# Patient Record
Sex: FEMALE | Race: WHITE | Employment: UNEMPLOYED | URBAN - METROPOLITAN AREA
[De-identification: names, ages, dates, MRNs, and addresses within clinical notes are randomized per-mention and may not be internally consistent; named-entity substitution may affect disease eponyms.]

---

## 2017-09-11 ENCOUNTER — MEDICAL CORRESPONDENCE (OUTPATIENT)
Dept: HEALTH INFORMATION MANAGEMENT | Facility: CLINIC | Age: 42
End: 2017-09-11

## 2017-09-11 ENCOUNTER — TRANSFERRED RECORDS (OUTPATIENT)
Dept: HEALTH INFORMATION MANAGEMENT | Facility: CLINIC | Age: 42
End: 2017-09-11

## 2017-09-12 ENCOUNTER — MEDICAL CORRESPONDENCE (OUTPATIENT)
Dept: HEALTH INFORMATION MANAGEMENT | Facility: CLINIC | Age: 42
End: 2017-09-12

## 2017-09-27 ENCOUNTER — PRE VISIT (OUTPATIENT)
Dept: NEUROLOGY | Facility: CLINIC | Age: 42
End: 2017-09-27

## 2017-09-27 NOTE — TELEPHONE ENCOUNTER
1.  Date/reason for appt:  10/02/17    Myopathy    2.  Referring provider:  H[facility] Pace    3.  Call to patient (Yes / No - short description):  No referred    4.  Previous care at / records requested from:  [facility]

## 2017-09-29 NOTE — TELEPHONE ENCOUNTER
Records received from [facility].   Included  Office notes: 9/11/17    Missing/needed records: additional records? (fax came from referrals)

## 2017-10-02 ENCOUNTER — OFFICE VISIT (OUTPATIENT)
Dept: NEUROLOGY | Facility: CLINIC | Age: 42
End: 2017-10-02

## 2017-10-02 VITALS
HEIGHT: 68 IN | DIASTOLIC BLOOD PRESSURE: 79 MMHG | BODY MASS INDEX: 44.41 KG/M2 | HEART RATE: 94 BPM | OXYGEN SATURATION: 99 % | TEMPERATURE: 97.7 F | RESPIRATION RATE: 28 BRPM | SYSTOLIC BLOOD PRESSURE: 160 MMHG | WEIGHT: 293 LBS

## 2017-10-02 DIAGNOSIS — G72.9 MYOPATHY: Primary | ICD-10-CM

## 2017-10-02 DIAGNOSIS — G72.9 MYOPATHY: ICD-10-CM

## 2017-10-02 DIAGNOSIS — G71.3 MITOCHONDRIAL MYOPATHY: Primary | ICD-10-CM

## 2017-10-02 LAB
CK SERPL-CCNC: 38 U/L (ref 30–225)
LACTATE BLD-SCNC: 1.2 MMOL/L (ref 0.7–2)
TSH SERPL DL<=0.005 MIU/L-ACNC: 0.78 MU/L (ref 0.4–4)

## 2017-10-02 RX ORDER — SUMATRIPTAN 100 MG/1
100 TABLET, FILM COATED ORAL DAILY PRN
COMMUNITY
Start: 2017-08-08

## 2017-10-02 RX ORDER — ALBUTEROL SULFATE 90 UG/1
2 AEROSOL, METERED RESPIRATORY (INHALATION) DAILY PRN
COMMUNITY
Start: 2015-04-11

## 2017-10-02 RX ORDER — PROTRIPTYLINE HYDROCHLORIDE 10 MG/1
1 TABLET, FILM COATED ORAL 3 TIMES DAILY
Status: ON HOLD | COMMUNITY
Start: 2017-09-29 | End: 2019-10-25

## 2017-10-02 RX ORDER — POLYETHYLENE GLYCOL 3350 17 G/17G
17 POWDER, FOR SOLUTION ORAL DAILY PRN
COMMUNITY
Start: 2016-06-17

## 2017-10-02 RX ORDER — EPINEPHRINE 0.3 MG/.3ML
0.3 INJECTION SUBCUTANEOUS DAILY PRN
COMMUNITY
Start: 2016-09-13

## 2017-10-02 ASSESSMENT — PAIN SCALES - GENERAL: PAINLEVEL: WORST PAIN (10)

## 2017-10-02 NOTE — PATIENT INSTRUCTIONS
If you have questions or concerns following today's appointment, please contact   Mary Shultz at [phone].    For more urgent concerns, contact the neurology department triage line at [phone] option 3.    Labs and pulmonary function testing today.  Follow up with Dr. Otero in EMG and then again in 3 months.     We now have a  available to help patients with psychosocial needs, supportive counseling, advanced care planning, and insurance and/or disability questions. You can reach SAVANNA Hoang, LICSW at [phone].

## 2017-10-02 NOTE — LETTER
"10/2/2017       RE: Marianne Millan  [address] [place] [address]     Dear Colleague,    Thank you for referring your patient, Marianne Millan, to the [facility] NEUROLOGY at [facility]. Please see a copy of my visit note below.      DEPARTMENT OF NEUROLOGY  Referral for: \"mitochondrial myopathy\"  Patient Name:  Marianne Millan  MRN:  [id number]  :  1975  Date of Clinic Visit:  2017  Primary Care Provider:  Juan Mendoza  Provider Requesting Consult: Loree Trinidad    CHIEF COMPLAINT: \"heaviness, weakness, pain in my legs\"    HISTORY OF PRESENT ILLNESS:  Marianne Millan is a 42 year old female presenting presenting for evaluation of mitochondrial myopathy. She is referred from her dermatologist, Dr. Trinidad.   Marianne has a past medical history notable primarily for migraines and chronic back pain. She noted that starting approximately 2 months ago that she felt weak in both of her hip flexors. She noted difficulty getting in and out of her car, using the assistance of her arms to help bring her legs into the car. She denies weakness at the knee joint or at the ankles. She has been using a cane to walk for fear that she will trip and fall, she is also been utilizing support from her boyfriend and roommate. She doesn't have a cane available. She also notes that her hands intermittently have this heavy sensation as well, and she is dropping things that seem unrelated to their weight. She denies any coordination difficulties. Around the same time she notes intermittent patchy erythema or forearms associated with a heat sensation. There is no obvious trigger or temporal pattern to this and it lasts a few hours. She denies any cranial nerve muscle involvement particularly with the extraocular movements. She does note intermittent trouble swallowing, she gives a specific example of having trouble swallowing skittles, but no persistent difficulty swallowing " "liquids or solids. She has had weight gain over the past 2 months of an unknown amount which she attributes primarily to reduced activity. She occasionally feels short of breath even when minimally exerting herself. She denies any chest pains syncopal episodes, loss of consciousness, nausea, vomiting, numbness tingling, changes in hair or nails. She's never had any similar symptoms in the past. She notes that her childhood was unremarkable, she had no developmental abnormalities and was able to keep up with other kids in her class.   She has a family history of \"mitochondrial disease.\" Her older sister has 2 young boys who were having a lot of chest pain when they were young which prompted a cardiomyopathy workup the results of which are reported to me as \"mitochondrial myopathy. \"The patient's sister was subsequently tested with a muscle biopsy but no genetic testing, she is unsure what the specific results were but says that it was consistent with a \"mitochondrial myopathy. \"Her symptoms include respiratory weakness as well as generalized fatigue; she notes that other doctors have said she has approximately 50% of her expected muscle mass. The patient's mother has not been tested genetically. The patient has not been tested either with muscle biopsy or genetics. The patient has a twin sister who denies any similar symptoms to either the patient or the patient's older sister. The patient's mother is notable for having congestive heart failure of unknown etiology since the 30s. There is also a history of strokes in both her mom and her dad.    ASSESSMENT AND PLAN:  Marianne is a 42-year-old female presenting with bilateral hip flexor and some upper extremity weakness for the 2 months. Her chief complaint and clinical exam findings do not specifically point to mitochondrial myopathy; this presentation may be consistent with deconditioning due to obesity/lack of exercise, or any general myopathy. Certainly the family " history of cardiac disease, putative mitochondrial myopathy in her sister, and maternal lineage transmission, raise the bar of suspicion high. Given the patient's endorsement of dyspnea, there is some concern for respiratory involvement so we will obtain pulmonary function tests today for baseline. Given the family's history of cardiomyopathy, it is certainly reasonable to assess the patient's baseline heart function with a transthoracic echocardiogram. We would like to review these records prior to ordering further workup on the patient. If there is a known mitochondrial DNA mutation in the family, it would be prudent to test patient's leukocytes for that mutation alone. The cost of targeted mutation testing is low, and this approach would circumvent the need for more expensive and invasive procedures (such as sequencing of the entire mitochondrial genome, or muscle biopsy). If the information available does NOT include genetic testing, we may need to proceed forward with a muscle biopsy, possibly with genetic testing of the muscle sample. Until we get that information we will get basic myopathy labs and an EMG at the patient's earliest convenience.    Plan:  - serum labs: CK, Lactate, TSH, Aldolase, plasma GDF-15 (it is possible for this to be elevated in patients who are obese, so a positive result should be interpreted with caution)  - EMG w/ Dr. Otero  - PFTs today  - TTE at patient's convenience  - will obtain records from patient's older sisterKimberly  - will not perform muscle biopsy until we learn more about family history and results of previous tests    Patient was seen and discussed with Dr. Otero.    Chris Bundy MD  Neurology PGY3  [facility]  Pager: [phone]    ATTENDING ADDENDUM: Patient seen and examined with resident Dr Bundy. Agree with his assessment and recs as above. TT spent for patient care 45 minutes; more than half was counseling. I edited the assessment  "and plan to reflect my own impression and findings. Royal Otero MD      PHYSICAL EXAMINATION:  Vitals: /79  Pulse 94  Temp 97.7  F (36.5  C) (Oral)  Resp 28  Ht 1.727 m (5' 8\")  Wt (!) 146.7 kg (323 lb 8 oz)  SpO2 99%  Breastfeeding? No  BMI 49.19 kg/m2  General: appears stated age, no distress, present with boyfriend and room-mate  HEENT: normocephalic, moist membranes, no trauma  Cardiac: hypertensive  Pulmonary: non-labored on room air  Abdomen: obese  Extremities: warm to palpation  Skin: hyperpigmented dermatofibromas on both arms and legs; mild erythematous patch on L forearm ~4cm and not warm or painful to palpation  Psych: cooperative, appropriate  Neuro:   Mental status: alert and oriented to person, place, and time; language is fluent with intact comprehension and naming   Cranial nerves: PERRL, EOMI with intact smooth pursuit, full visual fields, fundi within normal limits, no facial asymmetry or ptosis, 5/5 bilateral pterygoid/masseter strength, facial sensation intact and symmetric, hearing intact to conversation, tongue and uvula are midline, 5/5 bilateral SCM/Trap strength, no hypophonia, no dysarthria   Motor: gross bulk is normal; no abnormal posture, tone, fasciculations, or movements/tremor   RIGHT LEFT    5 5   FDI 5 5   2nd/3rd digit flexion 5 5   2nd/3rd digit extension 5 5   4th/5th digit flexion 5 5   4th/5th digit extension 5 5   Biceps 5 5   Triceps 5 5   Deltoid 5 5   Hip flexion 5 5   Knee extension 5 5   Knee flexion 5 5   Ankle flexion 5 5   Ankle extension 5 5    Reflexes: absent Babinski. Did not test Cox.   RIGHT LEFT   Brachioradialis 2+ 2+   Biceps 2+ 2+   Triceps 2+ 2+   Patellar 1+ 1+   Achilles 2+ 2+    Sensory: Intact to light touch, pin prick, and proprioception in all extremities without extinction. Romberg exam within normal limits.   Coordination: Intact FNF and heel-shin. No dysmetria. No dysdiadochokinesia.   Gait: able to stand from sitting " position without assistance or pushing off chair but she appears quite stirff; normal station; gait is cautious with a cane but stride length is symmetric    INVESTIGATIONS: prior JANIS and HIV labs personally reviewed    REVIEW OF SYSTEMS: 12-point RoS negative except as per HPI.    Allergies   Allergen Reactions     Hydrochlorothiazide Shortness Of Breath     Lisinopril Swelling     Other reaction(s): Swelling  Facial Swelling     Meperidine Hives     Norco [Hydrocodone-Acetaminophen]      Other reaction(s): Other  Severe headache  headache     Amitriptyline      Other reaction(s): Other  RAPID HEART RATE  Heart palpitations     Benadryl Allergy Decongestant  [Allergy Decongestant] Hives     Ceftin [Cefuroxime]      Other reaction(s): Other  Dizziness, lightheaded  Dizziness, lightheaded     Latex      Other reaction(s): Swelling     Metoclopramide      Other reaction(s): Other  akathisia     Tramadol      Other reaction(s): Unknown Reaction  Pt stated being very agitated and last time in hospital needed two doses of ativan to calm her down.      Nsaids      Other reaction(s): Other  History of gastric bypass surgery     Current Outpatient Prescriptions   Medication Sig Dispense Refill     cyanocobalamin (RA VITAMIN B-12 TR) 1000 MCG TBCR Take 1,000 mcg by mouth daily       albuterol (PROAIR HFA/PROVENTIL HFA/VENTOLIN HFA) 108 (90 BASE) MCG/ACT Inhaler Inhale 2 puffs into the lungs daily as needed       polyethylene glycol (MIRALAX/GLYCOLAX) powder Take 17 g by mouth daily as needed       EPINEPHrine (EPIPEN/ADRENACLICK/OR ANY BX GENERIC EQUIV) 0.3 MG/0.3ML injection 2-pack Inject 0.3 mg into the muscle daily as needed       tiZANidine (ZANAFLEX) 4 MG tablet Take 1 tablet by mouth 3 times daily       protriptyline (VIVACTIL) 10 MG tablet Take 1 tablet by mouth 3 times daily       SUMAtriptan (IMITREX) 100 MG tablet Take 100 mg by mouth daily as needed       acetaminophen-codeine (TYLENOL #3) 300-30 MG per tablet  "Take 1-2 tablets by mouth every 4 hours as needed for moderate pain       Multiple Vitamins-Minerals (MULTIVITAMIN OR) Take 1 tablet by mouth daily       CALCIUM CARBONATE PO Take 1 tablet by mouth daily       acetaminophen (TYLENOL) 500 MG tablet Take 1-2 tablets by mouth every 6 hours as needed       gabapentin (NEURONTIN) 300 MG capsule Take one tablet at bedtime for one week, then may increase to one tablet twice daily for one week, then one tablet three times daily. 90 capsule 3     PMH: migraines    Past Surgical History:   Procedure Laterality Date     GASTRIC BYPASS  2011     Family History:  Patient's mom has a history of congestive heart failure since her 30s, also has strokes. The patient is an older sister who has respiratory weakness as well as generalized fatigue, muscle biopsy per patient's sister's report is that it is consistent with a \"mitochondrial myopathy.\" The patient's older sister also has 2 boys primarily with cardiomyopathy symptoms. The patient has a 20 sister who denies any symptoms and he was 3 children who are all healthy. Patient's father has a history of strokes, diabetes, myocardial infarction.    Social History     Social History     Marital status: Single     Spouse name: N/A     Number of children: N/A     Years of education: N/A     Occupational History     Not on file.     Social History Main Topics     Smoking status: Never Smoker     Smokeless tobacco: Never Used     Alcohol use 0.6 - 1.2 oz/week     1 - 2 Glasses of wine per week      Comment: YEARLY     Drug use: No     Sexual activity: Yes     Partners: Male     Other Topics Concern     Not on file     Social History Narrative       Again, thank you for allowing me to participate in the care of your patient.      Sincerely,    Royal Otero MD      "

## 2017-10-02 NOTE — PROGRESS NOTES
"  DEPARTMENT OF NEUROLOGY  Referral for: \"mitochondrial myopathy\"  Patient Name:  Marianne Millan  MRN:  [id number]  :  1975  Date of Clinic Visit:  2017  Primary Care Provider:  Juan Mendoza  Provider Requesting Consult: Loree Trinidad    CHIEF COMPLAINT: \"heaviness, weakness, pain in my legs\"    HISTORY OF PRESENT ILLNESS:  Marianne Millan is a 42 year old female presenting presenting for evaluation of mitochondrial myopathy. She is referred from her dermatologist, Dr. Trinidad.   Marianne has a past medical history notable primarily for migraines and chronic back pain. She noted that starting approximately 2 months ago that she felt weak in both of her hip flexors. She noted difficulty getting in and out of her car, using the assistance of her arms to help bring her legs into the car. She denies weakness at the knee joint or at the ankles. She has been using a cane to walk for fear that she will trip and fall, she is also been utilizing support from her boyfriend and roommate. She doesn't have a cane available. She also notes that her hands intermittently have this heavy sensation as well, and she is dropping things that seem unrelated to their weight. She denies any coordination difficulties. Around the same time she notes intermittent patchy erythema or forearms associated with a heat sensation. There is no obvious trigger or temporal pattern to this and it lasts a few hours. She denies any cranial nerve muscle involvement particularly with the extraocular movements. She does note intermittent trouble swallowing, she gives a specific example of having trouble swallowing skittles, but no persistent difficulty swallowing liquids or solids. She has had weight gain over the past 2 months of an unknown amount which she attributes primarily to reduced activity. She occasionally feels short of breath even when minimally exerting herself. She denies any chest pains syncopal episodes, loss of " "consciousness, nausea, vomiting, numbness tingling, changes in hair or nails. She's never had any similar symptoms in the past. She notes that her childhood was unremarkable, she had no developmental abnormalities and was able to keep up with other kids in her class.   She has a family history of \"mitochondrial disease.\" Her older sister has 2 young boys who were having a lot of chest pain when they were young which prompted a cardiomyopathy workup the results of which are reported to me as \"mitochondrial myopathy. \"The patient's sister was subsequently tested with a muscle biopsy but no genetic testing, she is unsure what the specific results were but says that it was consistent with a \"mitochondrial myopathy. \"Her symptoms include respiratory weakness as well as generalized fatigue; she notes that other doctors have said she has approximately 50% of her expected muscle mass. The patient's mother has not been tested genetically. The patient has not been tested either with muscle biopsy or genetics. The patient has a twin sister who denies any similar symptoms to either the patient or the patient's older sister. The patient's mother is notable for having congestive heart failure of unknown etiology since the 30s. There is also a history of strokes in both her mom and her dad.    ASSESSMENT AND PLAN:  Marianne is a 42-year-old female presenting with bilateral hip flexor and some upper extremity weakness for the 2 months. Her chief complaint and clinical exam findings do not specifically point to mitochondrial myopathy; this presentation may be consistent with deconditioning due to obesity/lack of exercise, or any general myopathy. Certainly the family history of cardiac disease, putative mitochondrial myopathy in her sister, and maternal lineage transmission, raise the bar of suspicion high. Given the patient's endorsement of dyspnea, there is some concern for respiratory involvement so we will obtain pulmonary " "function tests today for baseline. Given the family's history of cardiomyopathy, it is certainly reasonable to assess the patient's baseline heart function with a transthoracic echocardiogram. We would like to review these records prior to ordering further workup on the patient. If there is a known mitochondrial DNA mutation in the family, it would be prudent to test patient's leukocytes for that mutation alone. The cost of targeted mutation testing is low, and this approach would circumvent the need for more expensive and invasive procedures (such as sequencing of the entire mitochondrial genome, or muscle biopsy). If the information available does NOT include genetic testing, we may need to proceed forward with a muscle biopsy, possibly with genetic testing of the muscle sample. Until we get that information we will get basic myopathy labs and an EMG at the patient's earliest convenience.    Plan:  - serum labs: CK, Lactate, TSH, Aldolase, plasma GDF-15 (it is possible for this to be elevated in patients who are obese, so a positive result should be interpreted with caution)  - EMG w/ Dr. Otero  - PFTs today  - TTE at patient's convenience  - will obtain records from patient's older sisterKimberly  - will not perform muscle biopsy until we learn more about family history and results of previous tests    Patient was seen and discussed with Dr. Otero.    Chris Bundy MD  Neurology PGY3  [facility]  Pager: [phone]    ATTENDING ADDENDUM: Patient seen and examined with resident Dr Bundy. Agree with his assessment and recs as above. TT spent for patient care 45 minutes; more than half was counseling. I edited the assessment and plan to reflect my own impression and findings. Royal Otero MD      PHYSICAL EXAMINATION:  Vitals: /79  Pulse 94  Temp 97.7  F (36.5  C) (Oral)  Resp 28  Ht 1.727 m (5' 8\")  Wt (!) 146.7 kg (323 lb 8 oz)  SpO2 99%  Breastfeeding? No  BMI " 49.19 kg/m2  General: appears stated age, no distress, present with boyfriend and room-mate  HEENT: normocephalic, moist membranes, no trauma  Cardiac: hypertensive  Pulmonary: non-labored on room air  Abdomen: obese  Extremities: warm to palpation  Skin: hyperpigmented dermatofibromas on both arms and legs; mild erythematous patch on L forearm ~4cm and not warm or painful to palpation  Psych: cooperative, appropriate  Neuro:   Mental status: alert and oriented to person, place, and time; language is fluent with intact comprehension and naming   Cranial nerves: PERRL, EOMI with intact smooth pursuit, full visual fields, fundi within normal limits, no facial asymmetry or ptosis, 5/5 bilateral pterygoid/masseter strength, facial sensation intact and symmetric, hearing intact to conversation, tongue and uvula are midline, 5/5 bilateral SCM/Trap strength, no hypophonia, no dysarthria   Motor: gross bulk is normal; no abnormal posture, tone, fasciculations, or movements/tremor   RIGHT LEFT    5 5   FDI 5 5   2nd/3rd digit flexion 5 5   2nd/3rd digit extension 5 5   4th/5th digit flexion 5 5   4th/5th digit extension 5 5   Biceps 5 5   Triceps 5 5   Deltoid 5 5   Hip flexion 5 5   Knee extension 5 5   Knee flexion 5 5   Ankle flexion 5 5   Ankle extension 5 5    Reflexes: absent Babinski. Did not test Cox.   RIGHT LEFT   Brachioradialis 2+ 2+   Biceps 2+ 2+   Triceps 2+ 2+   Patellar 1+ 1+   Achilles 2+ 2+    Sensory: Intact to light touch, pin prick, and proprioception in all extremities without extinction. Romberg exam within normal limits.   Coordination: Intact FNF and heel-shin. No dysmetria. No dysdiadochokinesia.   Gait: able to stand from sitting position without assistance or pushing off chair but she appears quite stirff; normal station; gait is cautious with a cane but stride length is symmetric    INVESTIGATIONS: prior JANIS and HIV labs personally reviewed    REVIEW OF SYSTEMS: 12-point RoS negative  except as per HPI.    Allergies   Allergen Reactions     Hydrochlorothiazide Shortness Of Breath     Lisinopril Swelling     Other reaction(s): Swelling  Facial Swelling     Meperidine Hives     Norco [Hydrocodone-Acetaminophen]      Other reaction(s): Other  Severe headache  headache     Amitriptyline      Other reaction(s): Other  RAPID HEART RATE  Heart palpitations     Benadryl Allergy Decongestant  [Allergy Decongestant] Hives     Ceftin [Cefuroxime]      Other reaction(s): Other  Dizziness, lightheaded  Dizziness, lightheaded     Latex      Other reaction(s): Swelling     Metoclopramide      Other reaction(s): Other  akathisia     Tramadol      Other reaction(s): Unknown Reaction  Pt stated being very agitated and last time in hospital needed two doses of ativan to calm her down.      Nsaids      Other reaction(s): Other  History of gastric bypass surgery     Current Outpatient Prescriptions   Medication Sig Dispense Refill     cyanocobalamin (RA VITAMIN B-12 TR) 1000 MCG TBCR Take 1,000 mcg by mouth daily       albuterol (PROAIR HFA/PROVENTIL HFA/VENTOLIN HFA) 108 (90 BASE) MCG/ACT Inhaler Inhale 2 puffs into the lungs daily as needed       polyethylene glycol (MIRALAX/GLYCOLAX) powder Take 17 g by mouth daily as needed       EPINEPHrine (EPIPEN/ADRENACLICK/OR ANY BX GENERIC EQUIV) 0.3 MG/0.3ML injection 2-pack Inject 0.3 mg into the muscle daily as needed       tiZANidine (ZANAFLEX) 4 MG tablet Take 1 tablet by mouth 3 times daily       protriptyline (VIVACTIL) 10 MG tablet Take 1 tablet by mouth 3 times daily       SUMAtriptan (IMITREX) 100 MG tablet Take 100 mg by mouth daily as needed       acetaminophen-codeine (TYLENOL #3) 300-30 MG per tablet Take 1-2 tablets by mouth every 4 hours as needed for moderate pain       Multiple Vitamins-Minerals (MULTIVITAMIN OR) Take 1 tablet by mouth daily       CALCIUM CARBONATE PO Take 1 tablet by mouth daily       acetaminophen (TYLENOL) 500 MG tablet Take 1-2 tablets  "by mouth every 6 hours as needed       gabapentin (NEURONTIN) 300 MG capsule Take one tablet at bedtime for one week, then may increase to one tablet twice daily for one week, then one tablet three times daily. 90 capsule 3     PMH: migraines    Past Surgical History:   Procedure Laterality Date     GASTRIC BYPASS  2011     Family History:  Patient's mom has a history of congestive heart failure since her 30s, also has strokes. The patient is an older sister who has respiratory weakness as well as generalized fatigue, muscle biopsy per patient's sister's report is that it is consistent with a \"mitochondrial myopathy.\" The patient's older sister also has 2 boys primarily with cardiomyopathy symptoms. The patient has a 20 sister who denies any symptoms and he was 3 children who are all healthy. Patient's father has a history of strokes, diabetes, myocardial infarction.    Social History     Social History     Marital status: Single     Spouse name: N/A     Number of children: N/A     Years of education: N/A     Occupational History     Not on file.     Social History Main Topics     Smoking status: Never Smoker     Smokeless tobacco: Never Used     Alcohol use 0.6 - 1.2 oz/week     1 - 2 Glasses of wine per week      Comment: YEARLY     Drug use: No     Sexual activity: Yes     Partners: Male     Other Topics Concern     Not on file     Social History Narrative       "

## 2017-10-02 NOTE — MR AVS SNAPSHOT
After Visit Summary   10/2/2017    Marianne Millan    MRN: [id number]           Patient Information     Date Of Birth          1975        Visit Information        Provider Department      10/2/2017 11:50 AM Royal Otero MD [facility] Neurology        Today's Diagnoses     Myopathy    -  1      Care Instructions    If you have questions or concerns following today's appointment, please contact   Mary Shultz at [phone].    For more urgent concerns, contact the neurology department triage line at [phone] option 3.    Labs and pulmonary function testing today.  Follow up with Dr. Otero in EMG and then again in 3 months.     We now have a  available to help patients with psychosocial needs, supportive counseling, advanced care planning, and insurance and/or disability questions. You can reach SAVANNA Hoang, [facility] at [phone].              Follow-ups after your visit        Your next 10 appointments already scheduled     Oct 02, 2017  1:30 PM CDT   PFT VISIT with  PFL B   [facility] Pulmonary Function Testing ([facility])    [address] [place] [address] [phone]            Oct 02, 2017  2:00 PM CDT   LAB with  LAB   [facility] Lab ([facility])    [address] [place] [address] [phone]           Patient must bring picture ID. Patient should be prepared to give a urine specimen  Please do not eat 10-12 hours before your appointment if you are coming in fasting for labs on lipids, cholesterol, or glucose (sugar). Pregnant women should follow their Care Team instructions. Water with medications is okay. Do not drink coffee or other fluids. If you have concerns about taking  your medications, please ask at office or if scheduling via Tarisa, send a message by clicking on Secure Messaging, Message Your Care Team.            Nov 07,  2017 10:15 AM CST   (Arrive by 10:00 AM)   EMG with Royal Otero MD   [facility] EMG ([facility])    [address] [address] [phone]           Do not use lotions or creams on the area to be tested. If you are on blood thinners (Warfarin, Coumadin, Lovenox, etc), please contact your primary care physician to check if it is safe to stop them 3 days prior to testing. If you have anxiety, please check with your referring physician to obtain anti-anxiety medication for the procedure.              Future tests that were ordered for you today     Open Future Orders        Priority Expected Expires Ordered    PFT General Lab Testing Routine 10/2/2017 10/2/2018 10/2/2017    PFT General Lab Testing Routine  10/2/2018 10/2/2017    EMG Routine  10/2/2018 10/2/2017    TSH with free T4 reflex Routine  10/2/2018 10/2/2017    Lactic acid whole blood Routine  10/2/2018 10/2/2017    Pyruvic acid Routine  10/2/2018 10/2/2017    plasma GDF15 level to [facility] [employer]: Laboratory Miscellaneous Order Routine  10/2/2018 10/2/2017    CK total Routine  10/2/2018 10/2/2017    Aldolase Routine  10/3/2018 10/2/2017            Who to contact     Please call your clinic at [phone] to:    Ask questions about your health    Make or cancel appointments    Discuss your medicines    Learn about your test results    Speak to your doctor   If you have compliments or concerns about an experience at your clinic, or if you wish to file a complaint, please contact [facility] Patient Relations at [phone] or email us at [email or web address]         Additional Information About Your Visit        zLensehart Information     Jambo is an electronic gateway that provides easy, online access to your medical records. With Jambo, you can request a clinic appointment, read your test results, renew a prescription or communicate  "with your care team.     To sign up for [email or web address] visit the website at [email or web address]   You will be asked to enter the access code listed below, as well as some personal information. Please follow the directions to create your username and password.     Your access code is: [email or web address]  Expires: 2017  6:30 AM     Your access code will  in 90 days. If you need help or a new code, please contact your [facility] or call [phone] for assistance.        Care EveryWhere ID     This is your Care EveryWhere ID. This could be used by other organizations to access your [place] medical records  [id number]        Your Vitals Were     Pulse Temperature Respirations Height Pulse Oximetry Breastfeeding?    94 97.7  F (36.5  C) (Oral) 28 1.727 m (5' 8\") 99% No    BMI (Body Mass Index)                   49.19 kg/m2            Blood Pressure from Last 3 Encounters:   10/02/17 160/79   14 113/71    Weight from Last 3 Encounters:   10/02/17 (!) 146.7 kg (323 lb 8 oz)   14 122.5 kg (270 lb)               Primary Care Provider Office Phone # Fax #    Juan Mendoza [phone]       [address] [address]        Equal Access to Services     ROSARIO VALIENTE AH: Hadii aisha ku hadasho Sorichard, waaxda luqadaha, qaybta kaalmada chapoyada, craig aragon. So [facility] [phone].    ATENCIÓN: Si habla español, tiene a wright disposición servicios gratuitos de asistencia lingüística. Meghana al [phone].    We comply with applicable federal civil rights laws and [place] laws. We do not discriminate on the basis of race, color, national origin, age, disability, sex, sexual orientation, or gender identity.            Thank you!     Thank you for choosing [facility] NEUROLOGY  for your care. Our goal is always to provide you with excellent care. Hearing back from our patients is one way we can continue to " improve our services. Please take a few minutes to complete the written survey that you may receive in the mail after your visit with us. Thank you!             Your Updated Medication List - Protect others around you: Learn how to safely use, store and throw away your medicines at www.disposemymeds.org.          This list is accurate as of: 10/2/17  1:28 PM.  Always use your most recent med list.                   Brand Name Dispense Instructions for use Diagnosis    acetaminophen-codeine 300-30 MG per tablet    TYLENOL #3     Take 1-2 tablets by mouth every 4 hours as needed for moderate pain        albuterol 108 (90 BASE) MCG/ACT Inhaler    PROAIR HFA/PROVENTIL HFA/VENTOLIN HFA     Inhale 2 puffs into the lungs daily as needed        CALCIUM CARBONATE PO      Take 1 tablet by mouth daily        EPINEPHrine 0.3 MG/0.3ML injection 2-pack    EPIPEN/ADRENACLICK/or ANY BX GENERIC EQUIV     Inject 0.3 mg into the muscle daily as needed        gabapentin 300 MG capsule    NEURONTIN    90 capsule    Take one tablet at bedtime for one week, then may increase to one tablet twice daily for one week, then one tablet three times daily.    Headache(784.0), Cervicalgia       MULTIVITAMIN PO      Take 1 tablet by mouth daily        polyethylene glycol powder    MIRALAX/GLYCOLAX     Take 17 g by mouth daily as needed        protriptyline 10 MG tablet    VIVACTIL     Take 1 tablet by mouth 3 times daily        RA VITAMIN B-12 TR 1000 MCG Tbcr   Generic drug:  cyanocobalamin      Take 1,000 mcg by mouth daily        SUMAtriptan 100 MG tablet    IMITREX     Take 100 mg by mouth daily as needed        tiZANidine 4 MG tablet    ZANAFLEX     Take 1 tablet by mouth 3 times daily        TYLENOL 500 MG tablet   Generic drug:  acetaminophen      Take 1-2 tablets by mouth every 6 hours as needed

## 2017-10-03 LAB
ALDOLASE SERPL-CCNC: 2.5 U/L (ref 1.5–8.1)
MISCELLANEOUS TEST: NORMAL

## 2017-10-04 LAB — PYRUVATE CSF-SCNC: 0.09 MMOL/L (ref 0.03–0.11)

## 2017-10-06 LAB
RESULT: NORMAL
SEND OUTS MISC TEST CODE: NORMAL
SEND OUTS MISC TEST SPECIMEN: NORMAL
TEST NAME: NORMAL

## 2017-10-09 LAB
EXPTIME-PRE: 7.83 SEC
FEF2575-%PRED-PRE: 89 %
FEF2575-PRE: 2.81 L/SEC
FEF2575-PRED: 3.15 L/SEC
FEFMAX-%PRED-PRE: 102 %
FEFMAX-PRE: 7.65 L/SEC
FEFMAX-PRED: 7.49 L/SEC
FEV1-%PRED-PRE: 102 %
FEV1-PRE: 3.17 L
FEV1FEV6-PRE: 79 %
FEV1FEV6-PRED: 83 %
FEV1FVC-PRE: 79 %
FEV1FVC-PRED: 82 %
FIFMAX-PRE: 5.97 L/SEC
FVC-%PRED-PRE: 106 %
FVC-PRE: 4.01 L
FVC-PRED: 3.77 L
MEP-PRE: 85 CMH2O
MIP-PRE: -70 CMH2O

## 2017-11-07 ENCOUNTER — OFFICE VISIT (OUTPATIENT)
Dept: NEUROLOGY | Facility: CLINIC | Age: 42
End: 2017-11-07

## 2017-11-07 DIAGNOSIS — M62.81 PROXIMAL MUSCLE WEAKNESS: Primary | ICD-10-CM

## 2017-11-07 DIAGNOSIS — G71.3 MITOCHONDRIAL MYOPATHY: ICD-10-CM

## 2017-11-07 NOTE — MR AVS SNAPSHOT
After Visit Summary   2017    Marianne Millan    MRN: [id number]           Patient Information     Date Of Birth          1975        Visit Information        Provider Department      2017 10:15 AM Royal Otero MD  Health EMG        Today's Diagnoses     Proximal muscle weakness    -  1    Mitochondrial myopathy           Follow-ups after your visit        Future tests that were ordered for you today     Open Future Orders        Priority Expected Expires Ordered    Needle EMG Each Extremity w/Paraspinal Area Limited (78545) Routine  2018            Who to contact     Please call your clinic at [phone] to:    Ask questions about your health    Make or cancel appointments    Discuss your medicines    Learn about your test results    Speak to your doctor   If you have compliments or concerns about an experience at your clinic, or if you wish to file a complaint, please contact [facility] Patient Relations at [phone] or email us at [email or web address]         Additional Information About Your Visit        MyChart Information     [employer] is an electronic gateway that provides easy, online access to your medical records. With [employer], you can request a clinic appointment, read your test results, renew a prescription or communicate with your care team.     To sign up for [employer] visit the website at [email or web address]   You will be asked to enter the access code listed below, as well as some personal information. Please follow the directions to create your username and password.     Your access code is: [email or web address]  Expires: 2017  5:30 AM     Your access code will  in 90 days. If you need help or a new code, please contact your [facility] or call [phone] for assistance.        Care EveryWhere ID     This is your Care EveryWhere ID. This could be used by  other organizations to access your [place] medical records  [id number]         Blood Pressure from Last 3 Encounters:   10/02/17 160/79   04/09/14 113/71    Weight from Last 3 Encounters:   10/02/17 (!) 146.7 kg (323 lb 8 oz)   04/09/14 122.5 kg (270 lb)              We Performed the Following     EMG     HC NCS MOTOR W OR W/O F-WAVE, 9 OR 10     Needle EMG Non-Extremity Muscles w/Nerve Conduction (37504)        Primary Care Provider Office Phone # Fax #    Juan Mendoza [phone]       [facility] [address] [facility] [address]        Equal Access to Services     ROSARIO VALIENTE : Hadii aisha jimenez Sorichard, waaxda luqadaha, qaybta kaalmada ademanuel, craig aragon. So [facility] [phone].    ATENCIÓN: Si habla español, tiene a wright disposición servicios gratuitos de asistencia lingüística. Llame al [phone].    We comply with applicable federal civil rights laws and [place] laws. We do not discriminate on the basis of race, color, national origin, age, disability, sex, sexual orientation, or gender identity.            Thank you!     Thank you for choosing [facility]  for your care. Our goal is always to provide you with excellent care. Hearing back from our patients is one way we can continue to improve our services. Please take a few minutes to complete the written survey that you may receive in the mail after your visit with us. Thank you!             Your Updated Medication List - Protect others around you: Learn how to safely use, store and throw away your medicines at www.disposemymeds.org.          This list is accurate as of: 11/7/17 11:15 AM.  Always use your most recent med list.                   Brand Name Dispense Instructions for use Diagnosis    acetaminophen-codeine 300-30 MG per tablet    TYLENOL #3     Take 1-2 tablets by mouth every 4 hours as needed for moderate pain        albuterol 108 (90 BASE) MCG/ACT Inhaler    PROAIR  HFA/PROVENTIL HFA/VENTOLIN HFA     Inhale 2 puffs into the lungs daily as needed        CALCIUM CARBONATE PO      Take 1 tablet by mouth daily        EPINEPHrine 0.3 MG/0.3ML injection 2-pack    EPIPEN/ADRENACLICK/or ANY BX GENERIC EQUIV     Inject 0.3 mg into the muscle daily as needed        gabapentin 300 MG capsule    NEURONTIN    90 capsule    Take one tablet at bedtime for one week, then may increase to one tablet twice daily for one week, then one tablet three times daily.    Headache(784.0), Cervicalgia       MULTIVITAMIN PO      Take 1 tablet by mouth daily        polyethylene glycol powder    MIRALAX/GLYCOLAX     Take 17 g by mouth daily as needed        protriptyline 10 MG tablet    VIVACTIL     Take 1 tablet by mouth 3 times daily        RA VITAMIN B-12 TR 1000 MCG Tbcr   Generic drug:  cyanocobalamin      Take 1,000 mcg by mouth daily        SUMAtriptan 100 MG tablet    IMITREX     Take 100 mg by mouth daily as needed        tiZANidine 4 MG tablet    ZANAFLEX     Take 1 tablet by mouth 3 times daily        TYLENOL 500 MG tablet   Generic drug:  acetaminophen      Take 1-2 tablets by mouth every 6 hours as needed

## 2017-11-07 NOTE — LETTER
11/7/2017     RE: Marianne Millan  [address] [place] [address]     Dear Colleague,    Thank you for referring your patient, Marianne Millan, to the [facility] EMG at [facility]. Please see a copy of my visit note below.        [facility]  Electrodiagnostic Laboratory    Nerve Conduction & EMG Report          Patient: Marianne Millan YOB: 1975  Patient ID: [id number] Age: 42 Years 5 Months  Gender: Female      History & Examination:    42 year old obese woman with weakness of hip flexion over the past 3 months and a family history of mitochondrial myopathy. CK, aldolase, blood lactic, pyruvic and GDF15 levels were all normal. Query myopathy.    Techniques: Motor and sensory conduction studies were done with surface recording electrodes. Temperature was monitored and recorded throughout the study. Upper extremities were maintained at a temperature of 32 degrees Centigrade or higher.  Lower extremities were maintained at a temperature of 31degrees Centigrade or higher. EMG was done with a concentric needle electrode.     Results:    Right median, ulnar, radial, and superficial peroneal antidromic sensory NCSs were normal. Right sural antidromic sensory NCS showed attenuated SNAP amplitude and normal conduction velocity, but size of the calf was such that supramaximal stimulation of the sural nerve was difficult to accomplish. Right median, ulnar, and tibial motor NCSs were normal. Right deep peroneal motor NCS showed normal distal latency and conduction velocities, and mildly attenuated CMAP amplitudes, although again, supramaximal stimulation of the nerve at the ankle may have been difficult due to size. Right tibial and ulnar F-waves were normal. EMG of right deltoid and vastus lateralis showed possibly early recruitment of either small, short duration, polyphasic (deltoid) or simply polyphasic, but normal amplitude (vastus lateralis) MUPs,  without abnormal spontaneous activity. EMG of right triceps, FDI, mid thoracic paraspinals, gluteus medius, tibialis anterior, and medial gastrocnemius, was normal.     Interpretation:    Possible, very mild, non-irritable myopathy. The needle EMG findings are truly borderline and therefore clinical correlation is required. Please remember that EMG in mitochondrial myopathies is not uncommonly normal or minimally abnormal.   The mildly attenuated right sural sensory and right peroneal motor amplitudes may be due to technical reasons (large lower leg size and inability to completely depolarize the nerves with surface stimulation). Again, clinical correlation for signs/symptoms of polyneuropathy would be useful.     EMG Physician:    Royal Otero MD         Sensory NCS      Nerve / Sites Rec. Site Onset Peak NP Amp Ref. PP Amp Dist Zoltan Ref. Temp     ms ms  V  V  V cm m/s m/s  C   R MEDIAN - Dig II Anti      Wrist Dig II 2.24 3.07 46.5 10.0 79.2 14 62.5 48.0 29.3   R ULNAR - Dig V Anti      Wrist Dig V 2.19 2.97 44.9 8.0 80.5 12.5 57.1 48.0 29.2   R RADIAL - Snuff      Forearm Snuff 1.82 2.40 45.0 15.0 67.2 10.5 57.6 48.0 29.7   R SURAL - Lat Mall      Calf Ankle 2.97 3.91 4.3 8.0 1.2 14 47.2 38.0 32.6      Calf Ankle 3.07 3.91 4.5 8.0 0.14 14 45.6  32.7      Calf Ankle 3.07 3.80 2.1 8.0 1.3 14 45.6  33.5   R SUP PERONEAL      Lat Leg Man 2.71 3.28 7.1  8.0 12.5 46.2 38.0 33       Motor NCS      Nerve / Sites Rec. Site Lat Ref. Amp Ref. Rel Amp Dist Zoltan Ref. Dur. Area Temp.     ms ms mV mV % cm m/s m/s ms %  C   R MEDIAN - APB      Wrist APB 3.85 4.40 7.4 5.0 100 8   5.31 100 30.1      Elbow APB 7.71  7.1  95.5 22 57.1 48.0 6.30 96.8 30.1   R ULNAR - ADM      Wrist ADM 3.23 3.50 10.0 5.0 100 8   6.30 100 30.3      B.Elbow ADM 6.82  9.2  91.9 21 58.4 48.0 6.30 93.9 30.1      A.Elbow ADM 8.39  8.1  81.6 8 51.2 48.0 6.41 79.1 29.8   R DEEP PERONEAL - EDB      Ankle EDB 3.39 6.00 2.2 2.5 100 8   4.22 100 32.1       FibHead EDB 12.29  1.7  77.5 37 41.5 38.0 4.74 83.1 32.4      Pop Fos EDB 13.75  1.7  76.6 8 54.9 38.0 4.79 82.5 32.5   R TIBIAL - AH      Ankle AH 5.47 6.00 6.7 4.0 100 8   4.95 100 32.7      Pop Fos AH 15.16  3.8  56.5 42 43.4 38.0 6.41 64.8 32.9       F  Wave      Nerve Min F Lat Max F Lat Mean FLat Temp.    ms ms ms  C   R TIBIAL 52.19 60.94 54.30 32.9   R ULNAR 28.23 29.48 28.61 29.4       EMG Summary Table     Spontaneous MUAP Recruitment    IA Fib Fasc H.F. Amp Dur. PPP Pattern   R. TIB ANTERIOR Normal None None None N N None Normal   R. GASTROCN (MED) Normal None None None N N None Normal   R. VAST LATERALIS Normal None None None N N 1+ early   R. FIRST D INTEROSS Normal None None None N N None Normal   R. TRICEPS Normal None None None N N None Normal   R. DELTOID Normal None None None 1- 1- 1+ early   R. GLUTEUS MED Normal None None None N N None Normal   R. THOR PSP (M) Normal None None None N N None Normal                                  Discussed EMG findings and results of recent lab studies with Marianne. So far I have no strong evidence to point to mitochondrial myopathy. I told her that in instances with borderline/equivocally abnormal EMG, minimal muscle weakness on exam, normal CK, lactic acid and GDF-15, muscle biopsy is very low yield and most likely will be a waste of patient's money and effort. The likelihood of MM is very low. I told Marianne that I would contact her sister (she has already given us permission to reach her) and inquire whether her family has had genetic testing for MM and what the testing showed. If there is a definitely pathogenic mutation found in leukocyte DNA I would test Marianne for the same mutation, and stop there. If however genetic confirmation has not been obtained, unfortunately we may have to go ahead with a muscle biopsy to confirm or refute MM- even the newest blood tests including GDF15 are definitely not 100% sensitive for this diagnosis, and EMGs are not  uncommonly normal in MM. Marianne has a good understanding of above. Will follow with her after we discuss the situation with her sister. GM    Again, thank you for allowing me to participate in the care of your patient.      Sincerely,    Royal Otero MD

## 2017-11-07 NOTE — PROGRESS NOTES
Discussed EMG findings and results of recent lab studies with Marianne. So far I have no strong evidence to point to mitochondrial myopathy. I told her that in instances with borderline/equivocally abnormal EMG, minimal muscle weakness on exam, normal CK, lactic acid and GDF-15, muscle biopsy is very low yield and most likely will be a waste of patient's money and effort. The likelihood of MM is very low. I told Marianne that I would contact her sister (she has already given us permission to reach her) and inquire whether her family has had genetic testing for MM and what the testing showed. If there is a definitely pathogenic mutation found in leukocyte DNA I would test Marianne for the same mutation, and stop there. If however genetic confirmation has not been obtained, unfortunately we may have to go ahead with a muscle biopsy to confirm or refute MM- even the newest blood tests including GDF15 are definitely not 100% sensitive for this diagnosis, and EMGs are not uncommonly normal in MM. Marianne has a good understanding of above. Will follow with her after we discuss the situation with her sister.     Addendum: Sister name is Kimberly, phone # is [phone].

## 2017-11-07 NOTE — PROGRESS NOTES
[facility]  Electrodiagnostic Laboratory    Nerve Conduction & EMG Report          Patient: Marianne Millan YOB: 1975  Patient ID: [id number] Age: 42 Years 5 Months  Gender: Female      History & Examination:    42 year old obese woman with weakness of hip flexion over the past 3 months and a family history of mitochondrial myopathy. CK, aldolase, blood lactic, pyruvic and GDF15 levels were all normal. Query myopathy.    Techniques: Motor and sensory conduction studies were done with surface recording electrodes. Temperature was monitored and recorded throughout the study. Upper extremities were maintained at a temperature of 32 degrees Centigrade or higher.  Lower extremities were maintained at a temperature of 31degrees Centigrade or higher. EMG was done with a concentric needle electrode.     Results:    Right median, ulnar, radial, and superficial peroneal antidromic sensory NCSs were normal. Right sural antidromic sensory NCS showed attenuated SNAP amplitude and normal conduction velocity, but size of the calf was such that supramaximal stimulation of the sural nerve was difficult to accomplish. Right median, ulnar, and tibial motor NCSs were normal. Right deep peroneal motor NCS showed normal distal latency and conduction velocities, and mildly attenuated CMAP amplitudes, although again, supramaximal stimulation of the nerve at the ankle may have been difficult due to size. Right tibial and ulnar F-waves were normal. EMG of right deltoid and vastus lateralis showed possibly early recruitment of either small, short duration, polyphasic (deltoid) or simply polyphasic, but normal amplitude (vastus lateralis) MUPs, without abnormal spontaneous activity. EMG of right triceps, FDI, mid thoracic paraspinals, gluteus medius, tibialis anterior, and medial gastrocnemius, was normal.     Interpretation:    Possible, very mild, non-irritable myopathy. The needle EMG findings are truly  borderline and therefore clinical correlation is required. Please remember that EMG in mitochondrial myopathies is not uncommonly normal or minimally abnormal.   The mildly attenuated right sural sensory and right peroneal motor amplitudes may be due to technical reasons (large lower leg size and inability to completely depolarize the nerves with surface stimulation). Again, clinical correlation for signs/symptoms of polyneuropathy would be useful.     EMG Physician:    Royal Otero MD         Sensory NCS      Nerve / Sites Rec. Site Onset Peak NP Amp Ref. PP Amp Dist Zoltan Ref. Temp     ms ms  V  V  V cm m/s m/s  C   R MEDIAN - Dig II Anti      Wrist Dig II 2.24 3.07 46.5 10.0 79.2 14 62.5 48.0 29.3   R ULNAR - Dig V Anti      Wrist Dig V 2.19 2.97 44.9 8.0 80.5 12.5 57.1 48.0 29.2   R RADIAL - Snuff      Forearm Snuff 1.82 2.40 45.0 15.0 67.2 10.5 57.6 48.0 29.7   R SURAL - Lat Mall      Calf Ankle 2.97 3.91 4.3 8.0 1.2 14 47.2 38.0 32.6      Calf Ankle 3.07 3.91 4.5 8.0 0.14 14 45.6  32.7      Calf Ankle 3.07 3.80 2.1 8.0 1.3 14 45.6  33.5   R SUP PERONEAL      Lat Leg Man 2.71 3.28 7.1  8.0 12.5 46.2 38.0 33       Motor NCS      Nerve / Sites Rec. Site Lat Ref. Amp Ref. Rel Amp Dist Zoltan Ref. Dur. Area Temp.     ms ms mV mV % cm m/s m/s ms %  C   R MEDIAN - APB      Wrist APB 3.85 4.40 7.4 5.0 100 8   5.31 100 30.1      Elbow APB 7.71  7.1  95.5 22 57.1 48.0 6.30 96.8 30.1   R ULNAR - ADM      Wrist ADM 3.23 3.50 10.0 5.0 100 8   6.30 100 30.3      B.Elbow ADM 6.82  9.2  91.9 21 58.4 48.0 6.30 93.9 30.1      A.Elbow ADM 8.39  8.1  81.6 8 51.2 48.0 6.41 79.1 29.8   R DEEP PERONEAL - EDB      Ankle EDB 3.39 6.00 2.2 2.5 100 8   4.22 100 32.1      FibHead EDB 12.29  1.7  77.5 37 41.5 38.0 4.74 83.1 32.4      Pop Fos EDB 13.75  1.7  76.6 8 54.9 38.0 4.79 82.5 32.5   R TIBIAL - AH      Ankle AH 5.47 6.00 6.7 4.0 100 8   4.95 100 32.7      Pop Fos AH 15.16  3.8  56.5 42 43.4 38.0 6.41 64.8 32.9       F  Wave       Nerve Min F Lat Max F Lat Mean FLat Temp.    ms ms ms  C   R TIBIAL 52.19 60.94 54.30 32.9   R ULNAR 28.23 29.48 28.61 29.4       EMG Summary Table     Spontaneous MUAP Recruitment    IA Fib Fasc H.F. Amp Dur. PPP Pattern   R. TIB ANTERIOR Normal None None None N N None Normal   R. GASTROCN (MED) Normal None None None N N None Normal   R. VAST LATERALIS Normal None None None N N 1+ early   R. FIRST D INTEROSS Normal None None None N N None Normal   R. TRICEPS Normal None None None N N None Normal   R. DELTOID Normal None None None 1- 1- 1+ early   R. GLUTEUS MED Normal None None None N N None Normal   R. THOR PSP (M) Normal None None None N N None Normal

## 2017-11-13 ENCOUNTER — TELEPHONE (OUTPATIENT)
Dept: NEUROLOGY | Facility: CLINIC | Age: 42
End: 2017-11-13

## 2017-11-27 ENCOUNTER — TELEPHONE (OUTPATIENT)
Dept: NEUROLOGY | Facility: CLINIC | Age: 42
End: 2017-11-27

## 2017-11-27 NOTE — TELEPHONE ENCOUNTER
Called patient to report that we have been unable to contact her sister Kimberly re: genetic testing and health history in helping us develop a plan of care for Marianne. Dr. Otero does not want to delay treatment further and would like to proceed with a muscle biopsy. Patient is agreeable to this.     Regina Payton, RN Care Coordinator

## 2017-11-27 NOTE — TELEPHONE ENCOUNTER
Spoke with patient's sister. She has agreed to sign an [place] for us to access her medical records. I will mail this to her and look for it back in the next 1-2 weeks.     Regina Payton RN Care Coordinator

## 2017-12-07 NOTE — TELEPHONE ENCOUNTER
Still have not received [place] back from sister, Kimberly. Called Marianne and let her know this and that if she talk to her sister, she should ask her about this. I will continue to monitor the mail for this document.     Regina Payton RN Care Coordinator

## 2019-01-14 ENCOUNTER — TELEPHONE (OUTPATIENT)
Dept: RHEUMATOLOGY | Facility: CLINIC | Age: 44
End: 2019-01-14

## 2019-01-14 NOTE — TELEPHONE ENCOUNTER
Informed referring provider that we do not see fibromyalgia, chronic pain in our clinic as we do not have the capacity to see all patients that are referred to us.   Nancy Santos CMA  1/14/2019 3:14 PM

## 2019-10-22 ENCOUNTER — TRANSFERRED RECORDS (OUTPATIENT)
Dept: HEALTH INFORMATION MANAGEMENT | Facility: CLINIC | Age: 44
End: 2019-10-22

## 2019-10-24 RX ORDER — FLUOXETINE 40 MG/1
40 CAPSULE ORAL DAILY
COMMUNITY

## 2019-10-24 RX ORDER — NABUMETONE 500 MG/1
500 TABLET, FILM COATED ORAL 2 TIMES DAILY
COMMUNITY

## 2019-10-24 RX ORDER — FUROSEMIDE 20 MG
20 TABLET ORAL PRN
COMMUNITY

## 2019-10-24 RX ORDER — ELETRIPTAN HYDROBROMIDE 40 MG/1
40 TABLET, FILM COATED ORAL
Status: ON HOLD | COMMUNITY
End: 2020-01-02

## 2019-10-24 RX ORDER — HYDROCODONE BITARTRATE AND ACETAMINOPHEN 5; 325 MG/1; MG/1
1 TABLET ORAL EVERY 6 HOURS PRN
Status: ON HOLD | COMMUNITY
End: 2021-04-15

## 2019-10-24 RX ORDER — TOPIRAMATE 50 MG/1
100 TABLET, FILM COATED ORAL
Status: ON HOLD | COMMUNITY
End: 2021-04-15

## 2019-10-24 RX ORDER — PREGABALIN 200 MG/1
200 CAPSULE ORAL 3 TIMES DAILY
COMMUNITY

## 2019-10-24 RX ORDER — NEOMYCIN/POLYMYXIN B/PRAMOXINE 3.5-10K-1
1 CREAM (GRAM) TOPICAL DAILY
COMMUNITY

## 2019-10-24 RX ORDER — TOPIRAMATE 50 MG/1
50 TABLET, FILM COATED ORAL 2 TIMES DAILY
Status: ON HOLD | COMMUNITY
End: 2021-04-15

## 2019-10-24 RX ORDER — TRIAMCINOLONE ACETONIDE 1 MG/G
OINTMENT TOPICAL 2 TIMES DAILY
Status: ON HOLD | COMMUNITY
End: 2021-04-15

## 2019-10-25 ENCOUNTER — APPOINTMENT (OUTPATIENT)
Dept: GENERAL RADIOLOGY | Facility: CLINIC | Age: 44
End: 2019-10-25
Attending: PHYSICAL MEDICINE & REHABILITATION
Payer: COMMERCIAL

## 2019-10-25 ENCOUNTER — ANESTHESIA EVENT (OUTPATIENT)
Dept: SURGERY | Facility: CLINIC | Age: 44
End: 2019-10-25
Payer: COMMERCIAL

## 2019-10-25 ENCOUNTER — HOSPITAL ENCOUNTER (OUTPATIENT)
Facility: CLINIC | Age: 44
Discharge: HOME | End: 2019-10-25
Attending: PHYSICAL MEDICINE & REHABILITATION | Admitting: PHYSICAL MEDICINE & REHABILITATION
Payer: COMMERCIAL

## 2019-10-25 ENCOUNTER — ANESTHESIA (OUTPATIENT)
Dept: SURGERY | Facility: CLINIC | Age: 44
End: 2019-10-25
Payer: COMMERCIAL

## 2019-10-25 VITALS
SYSTOLIC BLOOD PRESSURE: 112 MMHG | BODY MASS INDEX: 44.41 KG/M2 | TEMPERATURE: 97.4 F | OXYGEN SATURATION: 99 % | HEART RATE: 46 BPM | RESPIRATION RATE: 16 BRPM | DIASTOLIC BLOOD PRESSURE: 71 MMHG | HEIGHT: 68 IN | WEIGHT: 293 LBS

## 2019-10-25 LAB — HCG UR QL: NEGATIVE

## 2019-10-25 PROCEDURE — 25000128 H RX IP 250 OP 636: Performed by: NURSE ANESTHETIST, CERTIFIED REGISTERED

## 2019-10-25 PROCEDURE — 71000012 ZZH RECOVERY PHASE 1 LEVEL 1 FIRST HR: Performed by: PHYSICAL MEDICINE & REHABILITATION

## 2019-10-25 PROCEDURE — 37000009 ZZH ANESTHESIA TECHNICAL FEE, EACH ADDTL 15 MIN: Performed by: PHYSICAL MEDICINE & REHABILITATION

## 2019-10-25 PROCEDURE — 25800030 ZZH RX IP 258 OP 636: Performed by: NURSE ANESTHETIST, CERTIFIED REGISTERED

## 2019-10-25 PROCEDURE — 25000125 ZZHC RX 250: Performed by: PHYSICAL MEDICINE & REHABILITATION

## 2019-10-25 PROCEDURE — C1897 LEAD, NEUROSTIM TEST KIT: HCPCS | Performed by: PHYSICAL MEDICINE & REHABILITATION

## 2019-10-25 PROCEDURE — 36000065 ZZH SURGERY LEVEL 4 W FLUORO 1ST 30 MIN: Performed by: PHYSICAL MEDICINE & REHABILITATION

## 2019-10-25 PROCEDURE — 40000278 XR SURGERY CARM FLUORO LESS THAN 5 MIN

## 2019-10-25 PROCEDURE — 40000170 ZZH STATISTIC PRE-PROCEDURE ASSESSMENT II: Performed by: PHYSICAL MEDICINE & REHABILITATION

## 2019-10-25 PROCEDURE — 25000125 ZZHC RX 250: Performed by: NURSE ANESTHETIST, CERTIFIED REGISTERED

## 2019-10-25 PROCEDURE — 36000063 ZZH SURGERY LEVEL 4 EA 15 ADDTL MIN: Performed by: PHYSICAL MEDICINE & REHABILITATION

## 2019-10-25 PROCEDURE — 27210794 ZZH OR GENERAL SUPPLY STERILE: Performed by: PHYSICAL MEDICINE & REHABILITATION

## 2019-10-25 PROCEDURE — 25000128 H RX IP 250 OP 636: Performed by: PHYSICAL MEDICINE & REHABILITATION

## 2019-10-25 PROCEDURE — 37000008 ZZH ANESTHESIA TECHNICAL FEE, 1ST 30 MIN: Performed by: PHYSICAL MEDICINE & REHABILITATION

## 2019-10-25 PROCEDURE — 71000027 ZZH RECOVERY PHASE 2 EACH 15 MINS: Performed by: PHYSICAL MEDICINE & REHABILITATION

## 2019-10-25 PROCEDURE — 25000128 H RX IP 250 OP 636: Performed by: ANESTHESIOLOGY

## 2019-10-25 PROCEDURE — 81025 URINE PREGNANCY TEST: CPT | Performed by: ANESTHESIOLOGY

## 2019-10-25 DEVICE — IMPLANTABLE DEVICE: Type: IMPLANTABLE DEVICE | Site: THORACIC | Status: FUNCTIONAL

## 2019-10-25 RX ORDER — DEXTROSE MONOHYDRATE 25 G/50ML
25-50 INJECTION, SOLUTION INTRAVENOUS
Status: DISCONTINUED | OUTPATIENT
Start: 2019-10-25 | End: 2019-10-25 | Stop reason: HOSPADM

## 2019-10-25 RX ORDER — FENTANYL CITRATE 50 UG/ML
25-50 INJECTION, SOLUTION INTRAMUSCULAR; INTRAVENOUS
Status: DISCONTINUED | OUTPATIENT
Start: 2019-10-25 | End: 2019-10-25 | Stop reason: HOSPADM

## 2019-10-25 RX ORDER — SODIUM CHLORIDE, SODIUM LACTATE, POTASSIUM CHLORIDE, CALCIUM CHLORIDE 600; 310; 30; 20 MG/100ML; MG/100ML; MG/100ML; MG/100ML
INJECTION, SOLUTION INTRAVENOUS CONTINUOUS
Status: DISCONTINUED | OUTPATIENT
Start: 2019-10-25 | End: 2019-10-25 | Stop reason: HOSPADM

## 2019-10-25 RX ORDER — PROPOFOL 10 MG/ML
INJECTION, EMULSION INTRAVENOUS CONTINUOUS PRN
Status: DISCONTINUED | OUTPATIENT
Start: 2019-10-25 | End: 2019-10-25

## 2019-10-25 RX ORDER — ONDANSETRON 2 MG/ML
4 INJECTION INTRAMUSCULAR; INTRAVENOUS EVERY 30 MIN PRN
Status: DISCONTINUED | OUTPATIENT
Start: 2019-10-25 | End: 2019-10-25 | Stop reason: HOSPADM

## 2019-10-25 RX ORDER — NICOTINE POLACRILEX 4 MG
15-30 LOZENGE BUCCAL
Status: DISCONTINUED | OUTPATIENT
Start: 2019-10-25 | End: 2019-10-25 | Stop reason: HOSPADM

## 2019-10-25 RX ORDER — MAGNESIUM HYDROXIDE 1200 MG/15ML
LIQUID ORAL PRN
Status: DISCONTINUED | OUTPATIENT
Start: 2019-10-25 | End: 2019-10-25 | Stop reason: HOSPADM

## 2019-10-25 RX ORDER — LIDOCAINE HYDROCHLORIDE 20 MG/ML
INJECTION, SOLUTION INFILTRATION; PERINEURAL PRN
Status: DISCONTINUED | OUTPATIENT
Start: 2019-10-25 | End: 2019-10-25

## 2019-10-25 RX ORDER — ONDANSETRON 2 MG/ML
INJECTION INTRAMUSCULAR; INTRAVENOUS PRN
Status: DISCONTINUED | OUTPATIENT
Start: 2019-10-25 | End: 2019-10-25

## 2019-10-25 RX ORDER — CLINDAMYCIN PHOSPHATE 900 MG/50ML
900 INJECTION, SOLUTION INTRAVENOUS
Status: COMPLETED | OUTPATIENT
Start: 2019-10-25 | End: 2019-10-25

## 2019-10-25 RX ORDER — SODIUM CHLORIDE, SODIUM LACTATE, POTASSIUM CHLORIDE, CALCIUM CHLORIDE 600; 310; 30; 20 MG/100ML; MG/100ML; MG/100ML; MG/100ML
INJECTION, SOLUTION INTRAVENOUS CONTINUOUS PRN
Status: DISCONTINUED | OUTPATIENT
Start: 2019-10-25 | End: 2019-10-25

## 2019-10-25 RX ORDER — NALOXONE HYDROCHLORIDE 0.4 MG/ML
.1-.4 INJECTION, SOLUTION INTRAMUSCULAR; INTRAVENOUS; SUBCUTANEOUS
Status: CANCELLED | OUTPATIENT
Start: 2019-10-25 | End: 2019-10-26

## 2019-10-25 RX ORDER — ONDANSETRON 4 MG/1
4 TABLET, ORALLY DISINTEGRATING ORAL EVERY 30 MIN PRN
Status: DISCONTINUED | OUTPATIENT
Start: 2019-10-25 | End: 2019-10-25 | Stop reason: HOSPADM

## 2019-10-25 RX ORDER — HYDROMORPHONE HYDROCHLORIDE 1 MG/ML
.3-.5 INJECTION, SOLUTION INTRAMUSCULAR; INTRAVENOUS; SUBCUTANEOUS EVERY 5 MIN PRN
Status: DISCONTINUED | OUTPATIENT
Start: 2019-10-25 | End: 2019-10-25 | Stop reason: HOSPADM

## 2019-10-25 RX ORDER — LIDOCAINE 40 MG/G
CREAM TOPICAL
Status: DISCONTINUED | OUTPATIENT
Start: 2019-10-25 | End: 2019-10-25 | Stop reason: HOSPADM

## 2019-10-25 RX ADMIN — ONDANSETRON 4 MG: 2 INJECTION INTRAMUSCULAR; INTRAVENOUS at 11:23

## 2019-10-25 RX ADMIN — CLINDAMYCIN PHOSPHATE 900 MG: 900 INJECTION, SOLUTION INTRAVENOUS at 11:22

## 2019-10-25 RX ADMIN — FENTANYL CITRATE 50 MCG: 0.05 INJECTION, SOLUTION INTRAMUSCULAR; INTRAVENOUS at 12:22

## 2019-10-25 RX ADMIN — DEXMEDETOMIDINE HYDROCHLORIDE 12 MCG: 100 INJECTION, SOLUTION INTRAVENOUS at 11:18

## 2019-10-25 RX ADMIN — MIDAZOLAM 0.5 MG: 1 INJECTION INTRAMUSCULAR; INTRAVENOUS at 11:43

## 2019-10-25 RX ADMIN — LIDOCAINE HYDROCHLORIDE 40 MG: 20 INJECTION, SOLUTION INFILTRATION; PERINEURAL at 11:18

## 2019-10-25 RX ADMIN — PROPOFOL 25 MCG/KG/MIN: 10 INJECTION, EMULSION INTRAVENOUS at 11:16

## 2019-10-25 RX ADMIN — SODIUM CHLORIDE, POTASSIUM CHLORIDE, SODIUM LACTATE AND CALCIUM CHLORIDE: 600; 310; 30; 20 INJECTION, SOLUTION INTRAVENOUS at 11:18

## 2019-10-25 RX ADMIN — FENTANYL CITRATE 50 MCG: 0.05 INJECTION, SOLUTION INTRAMUSCULAR; INTRAVENOUS at 12:30

## 2019-10-25 RX ADMIN — DEXMEDETOMIDINE HYDROCHLORIDE 8 MCG: 100 INJECTION, SOLUTION INTRAVENOUS at 11:22

## 2019-10-25 ASSESSMENT — ENCOUNTER SYMPTOMS: SEIZURES: 0

## 2019-10-25 ASSESSMENT — LIFESTYLE VARIABLES: TOBACCO_USE: 0

## 2019-10-25 ASSESSMENT — MIFFLIN-ST. JEOR: SCORE: 2045.68

## 2019-10-25 NOTE — OR NURSING
"Pt reports that she has \"taste of metal in my mouth\" Dr Cannon called and aware- will come and assess pt. Surgeon at pt bedside. Addendum- no numbness to mouth, lips or mm twitching noted.   "

## 2019-10-25 NOTE — ANESTHESIA CARE TRANSFER NOTE
Patient: Marianne Millan    Procedure(s):  TRIAL, SPINAL CORD STIMULATOR    Diagnosis: Neuritis or radiculitis due to rupture of lumbar intervertebral disc [M51.16]  Diagnosis Additional Information: No value filed.    Anesthesia Type:   MAC     Note:  Airway :Room Air  Patient transferred to:PACU  Comments: Peace Report: Identifed the Patient, Identified the Reponsible Provider, Reviewed the pertinent medical history, Discussed the surgical course, Reviewed Intra-OP anesthesia mangement and issues during anesthesia, Set expectations for post-procedure period and Allowed opportunity for questions and acknowledgement of understanding      Vitals: (Last set prior to Anesthesia Care Transfer)    CRNA VITALS  10/25/2019 1135 - 10/25/2019 1214      10/25/2019             Resp Rate (set):  10                Electronically Signed By: TANIA Harris CRNA  October 25, 2019  12:14 PM

## 2019-10-25 NOTE — OR NURSING
[employer] rep at bedside for trial or machine- [employer] rep will talk to pt and family in Phase 2 for discharge teachings.

## 2019-10-25 NOTE — PROCEDURES
"Procedure: Spinal cord stimulator trial ([employer])     Lead Lot Numbers:  Model [id number]  Lead 1: [id number]  Lead 2: [id number]     Description of Procedure: IV access was obtained via a registered nurse. Clindamycin was given for infection prophylaxis. An [employer] representative met with the patient to discuss where the patient's pain was located in relation to where they hoped to receive spinal cord stimulation.      The patient was brought into the procedure room and positioned prone on the fluoroscopy table with two pillows under their abdomen and prepped with Duraprep surgical solution and draped in the usual sterile fashion. The registered nurse initiated a verbal timeout or \"pause for the cause\" stating the patient's name, date of birth, and procedure to be performed. Imer Hutson MD then repeated back the procedure and the patient's name in confirmation.      MAC sedation was administered in increments throughout the procedure, which resulted in satisfactory relaxation and sedation. Normal Saline ran through the IV over the course of the procedure. The patient was monitored throughout the procedure by a CRNA. Physiologic parameters were observed utilizing pulse oximetery and blood pressure checks during the procedure. Monitoring revealed normal oxygen saturation and pulse rate.     The patient's skin was anesthetized at the appropriate level and a 14 gauge Tuohy needle was introduced through the skin on the left side. Fluoroscopy was used to identify correct placement. The neurostimulator kit manufactured by [employer] was used. The needle was advanced up to the T12-L1 lamina then advanced further through the T12-L1 interlaminar space. The epidural space was penetrated as a loss of resistance was felt with an air filled syringe. The stylet was then removed and a spinal cord stimulator lead with 8 contacts was introduced. The lead was advanced without difficulty under fluoroscopy until the top of the lead was at the top " T7 vertebral level. This lead was left of midline. A lateral fluoroscopy view confirmed posterior epidural placement.      A second 14 gauge Tuohy needle was introduced through the skin on the left side. Fluoroscopy was used to identify correct placement. The neurostimulator kit manufactured by [employer]  was used. The needle was advanced up to the L1-2 lamina and then advanced further through the L1-2 interlaminar space. The epidural space was penetrated as a loss of resistance was felt with an air filled syringe. The stylet was then removed and a spinal cord stimulator lead with 8 contacts was introduced. The lead was advanced without difficulty under fluoroscopy until the top of the lead was at the top T7 vertebral level. This lead was right of midline. A lateral fluoroscopy view confirmed posterior epidural placement.      The needles were removed and the leads were secured to the skin with steri strips. The insertion sites were then covered with a Tegaderm dressing. The leads were connected to the external pulse generator.      The patient was brought back to the recovery area, where they were monitored by a registered nurse. Physiologic parameters were observed utilizing pulse oximetery and blood pressure checks. Monitoring revealed normal oxygen saturation and pulse rate. The patient tolerated the procedure well and no immediate complications were encountered. Further reprogramming was performed in the recovery area. The patient was shown how to use the neurostimulator controls and will follow up in our office for re-evaluation.      Imer Hutson MD  [facility]     Comments: Both leads were placed on the left side because the C-arm could only do a 45 degree oblique toward one side.

## 2019-10-25 NOTE — PROGRESS NOTES
Admission medication history interview status for the 10/25/2019  admission is complete. See EPIC admission navigator for prior to admission medications     Medication history source reliability:Good    Medication history interview source(s):Patient    Medication history resources (including written lists, pill bottles, clinic record):None    Primary pharmacy.[facility]    Additional medication history information not noted on PTA med list :None    Time spent in this activity: 45 minutes    Prior to Admission medications    Medication Sig Last Dose Taking? Auth Provider   acetaminophen (TYLENOL) 500 MG tablet Take 1-2 tablets by mouth 3 times daily  10/25/2019 at am Yes Reported, Patient   albuterol (PROAIR HFA/PROVENTIL HFA/VENTOLIN HFA) 108 (90 BASE) MCG/ACT Inhaler Inhale 2 puffs into the lungs daily as needed 10/23/2019 at prn Yes Reported, Patient   Calcium Carbonate Antacid (TUMS E-X PO) Take 4 tablets by mouth 2 times daily as needed  10/25/2019 at 0700 Yes Reported, Patient   calcium carbonate-vitamin D (OS-TRIPP) 500-400 MG-UNIT tablet Take 1 tablet by mouth daily 10/25/2019 at am Yes Reported, Patient   cholecalciferol (VITAMIN D3) 5000 units (125 mcg) capsule Take 5,000 Units by mouth daily 10/25/2019 at am Yes Reported, Patient   cyanocobalamin (RA VITAMIN B-12 TR) 1000 MCG TBCR Take 1,000 mcg by mouth daily 10/25/2019 at am Yes Reported, Patient   eletriptan (RELPAX) 40 MG tablet Take 40 mg by mouth at onset of headache for migraine 10/24/2019 at Unknown time Yes Reported, Patient   EPINEPHrine (EPIPEN/ADRENACLICK/OR ANY BX GENERIC EQUIV) 0.3 MG/0.3ML injection 2-pack Inject 0.3 mg into the muscle daily as needed never used at prn Yes Reported, Patient   FLUoxetine (PROZAC) 40 MG capsule Take 40 mg by mouth daily 10/25/2019 at am Yes Reported, Patient   furosemide (LASIX) 20 MG tablet Take 20 mg by mouth daily 10/25/2019 at am Yes Reported, Patient   HYDROcodone-acetaminophen (NORCO) 5-325 MG tablet Take 1  tablet by mouth every 6 hours as needed for severe pain 10/24/2019 at prn Yes Reported, Patient   Multiple Vitamins-Minerals (MULTI-VITAMIN GUMMIES) CHEW Take 1 chew tab by mouth daily 10/25/2019 at am Yes Reported, Patient   nabumetone (RELAFEN) 500 MG tablet Take 500 mg by mouth 2 times daily 10/22/2019 Yes Reported, Patient   naloxone (NARCAN) 4 MG/0.1ML nasal spray Spray 4 mg into one nostril alternating nostrils once as needed for opioid reversal every 2-3 minutes until assistance arrives never used at prn Yes Reported, Patient   polyethylene glycol (MIRALAX/GLYCOLAX) powder Take 17 g by mouth daily as needed 10/22/2019 Yes Reported, Patient   Pregabalin (LYRICA) 200 MG capsule Take 200 mg by mouth 3 times daily 10/25/2019 at am Yes Reported, Patient   SUMAtriptan (IMITREX) 100 MG tablet Take 100 mg by mouth daily as needed more than a week at prn Yes Reported, Patient   tiZANidine (ZANAFLEX) 4 MG tablet Take 1 tablet by mouth 3 times daily 10/25/2019 at am Yes Reported, Patient   topiramate (TOPAMAX) 50 MG tablet Take 50 mg by mouth 2 times daily (Morning and lunch)  (in addition to PM dose) 10/25/2019 at am Yes Reported, Patient   topiramate (TOPAMAX) 50 MG tablet Take 100 mg by mouth daily (with dinner) (Takes 2 X 50 mg = 100 mg dose)   (in addition to AM and Noon doses) 10/24/2019 at pm Yes Reported, Patient   triamcinolone (KENALOG) 0.1 % external ointment Apply topically 2 times daily (Apply to Affected Area) 10/25/2019 at prn Yes Reported, Patient

## 2019-10-25 NOTE — ANESTHESIA POSTPROCEDURE EVALUATION
Patient: Marianne Millan    Procedure(s):  TRIAL, SPINAL CORD STIMULATOR    Diagnosis:Neuritis or radiculitis due to rupture of lumbar intervertebral disc [M51.16]  Diagnosis Additional Information: No value filed.    Anesthesia Type:  MAC    Note:  Anesthesia Post Evaluation    Patient location during evaluation: PACU  Patient participation: Able to fully participate in evaluation  Level of consciousness: awake  Pain management: adequate  Airway patency: patent  Cardiovascular status: acceptable  Respiratory status: acceptable  Hydration status: acceptable  PONV: none     Anesthetic complications: None          Last vitals:  Vitals:    10/25/19 1235 10/25/19 1245 10/25/19 1329   BP: 108/64 117/69 112/71   Pulse:  (!) 46    Resp: 17 13 16   Temp:  36.3  C (97.4  F)    SpO2: 96% 100% 99%         Electronically Signed By: Stacy Cannon  October 25, 2019  2:34 PM

## 2019-10-25 NOTE — OR NURSING
Metallic tase to mouth gone- Dr Cannon at bedside- OK to send to phase 2 for discharge to home per Dr Cannon.

## 2019-10-25 NOTE — ANESTHESIA PREPROCEDURE EVALUATION
"Anesthesia Pre-Procedure Evaluation    Patient: Marianne Millan   MRN: [id number] : 1975          Preoperative Diagnosis: Neuritis or radiculitis due to rupture of lumbar intervertebral disc [M51.16]    Procedure(s):  TRIAL, SPINAL CORD STIMULATOR([employer]) (C-ARM)    Past Medical History:   Diagnosis Date     Obese      Past Surgical History:   Procedure Laterality Date     GASTRIC BYPASS       GI SURGERY      GASTRIC BYPASS       Anesthesia Evaluation     . Pt has had prior anesthetic.     No history of anesthetic complications          ROS/MED HX    ENT/Pulmonary:      (-) tobacco use, asthma and sleep apnea   Neurologic:      (-) seizures and CVA   Cardiovascular:         METS/Exercise Tolerance:     Hematologic:         Musculoskeletal:         GI/Hepatic:        (-) GERD   Renal/Genitourinary:         Endo:     (+) Obesity, .      Psychiatric:     (+) psychiatric history       Infectious Disease:         Malignancy:         Other:                          Physical Exam  Normal systems: dental    Airway   Mallampati: II  TM distance: >3 FB  Neck ROM: full    Dental     Cardiovascular   Rhythm and rate: regular and normal      Pulmonary             Lab Results   Component Value Date    TSH 0.78 10/02/2017    HCG Negative 10/25/2019       Preop Vitals  BP Readings from Last 3 Encounters:   10/25/19 134/60   10/02/17 160/79   14 113/71    Pulse Readings from Last 3 Encounters:   10/25/19 64   10/02/17 94   14 63      Resp Readings from Last 3 Encounters:   10/25/19 18   10/02/17 28    SpO2 Readings from Last 3 Encounters:   10/25/19 98%   10/02/17 99%      Temp Readings from Last 1 Encounters:   10/25/19 35.6  C (96  F) (Oral)    Ht Readings from Last 1 Encounters:   10/25/19 1.727 m (5' 8\")      Wt Readings from Last 1 Encounters:   10/25/19 134.7 kg (297 lb)    Estimated body mass index is 45.16 kg/m  as calculated from the following:    Height as of this encounter: 1.727 m (5' 8\").    " Weight as of this encounter: 134.7 kg (297 lb).       Anesthesia Plan      History & Physical Review  History and physical reviewed and following examination; no interval change.    ASA Status:  2 .        Plan for MAC with Intravenous induction.   PONV prophylaxis:  Ondansetron (or other 5HT-3) and Dexamethasone or Solumedrol       Postoperative Care  Postoperative pain management:  IV analgesics and Oral pain medications.      Consents  Anesthetic plan, risks, benefits and alternatives discussed with:  Patient..                 Stacy Cannon

## 2020-01-02 ENCOUNTER — ANESTHESIA EVENT (OUTPATIENT)
Dept: SURGERY | Facility: CLINIC | Age: 45
End: 2020-01-02
Payer: COMMERCIAL

## 2020-01-02 ENCOUNTER — APPOINTMENT (OUTPATIENT)
Dept: GENERAL RADIOLOGY | Facility: CLINIC | Age: 45
End: 2020-01-02
Attending: PHYSICAL MEDICINE & REHABILITATION
Payer: COMMERCIAL

## 2020-01-02 ENCOUNTER — ANESTHESIA (OUTPATIENT)
Dept: SURGERY | Facility: CLINIC | Age: 45
End: 2020-01-02
Payer: COMMERCIAL

## 2020-01-02 ENCOUNTER — HOSPITAL ENCOUNTER (OUTPATIENT)
Facility: CLINIC | Age: 45
Discharge: HOME | End: 2020-01-02
Attending: PHYSICAL MEDICINE & REHABILITATION | Admitting: PHYSICAL MEDICINE & REHABILITATION
Payer: COMMERCIAL

## 2020-01-02 VITALS
SYSTOLIC BLOOD PRESSURE: 110 MMHG | OXYGEN SATURATION: 98 % | HEART RATE: 52 BPM | BODY MASS INDEX: 44.41 KG/M2 | RESPIRATION RATE: 12 BRPM | TEMPERATURE: 97.7 F | HEIGHT: 68 IN | WEIGHT: 293 LBS | DIASTOLIC BLOOD PRESSURE: 73 MMHG

## 2020-01-02 LAB — HCG UR QL: NEGATIVE

## 2020-01-02 PROCEDURE — 71000027 ZZH RECOVERY PHASE 2 EACH 15 MINS: Performed by: PHYSICAL MEDICINE & REHABILITATION

## 2020-01-02 PROCEDURE — 36000065 ZZH SURGERY LEVEL 4 W FLUORO 1ST 30 MIN: Performed by: PHYSICAL MEDICINE & REHABILITATION

## 2020-01-02 PROCEDURE — 25800030 ZZH RX IP 258 OP 636: Performed by: NURSE ANESTHETIST, CERTIFIED REGISTERED

## 2020-01-02 PROCEDURE — 27210794 ZZH OR GENERAL SUPPLY STERILE: Performed by: PHYSICAL MEDICINE & REHABILITATION

## 2020-01-02 PROCEDURE — 27810325 ZZHC OR IMPLANT OTHER OPNP: Performed by: PHYSICAL MEDICINE & REHABILITATION

## 2020-01-02 PROCEDURE — 25000125 ZZHC RX 250: Performed by: PHYSICAL MEDICINE & REHABILITATION

## 2020-01-02 PROCEDURE — 37000009 ZZH ANESTHESIA TECHNICAL FEE, EACH ADDTL 15 MIN: Performed by: PHYSICAL MEDICINE & REHABILITATION

## 2020-01-02 PROCEDURE — C1778 LEAD, NEUROSTIMULATOR: HCPCS | Performed by: PHYSICAL MEDICINE & REHABILITATION

## 2020-01-02 PROCEDURE — 71000012 ZZH RECOVERY PHASE 1 LEVEL 1 FIRST HR: Performed by: PHYSICAL MEDICINE & REHABILITATION

## 2020-01-02 PROCEDURE — C1767 GENERATOR, NEURO NON-RECHARG: HCPCS | Performed by: PHYSICAL MEDICINE & REHABILITATION

## 2020-01-02 PROCEDURE — 37000008 ZZH ANESTHESIA TECHNICAL FEE, 1ST 30 MIN: Performed by: PHYSICAL MEDICINE & REHABILITATION

## 2020-01-02 PROCEDURE — 25000132 ZZH RX MED GY IP 250 OP 250 PS 637: Performed by: ANESTHESIOLOGY

## 2020-01-02 PROCEDURE — 25000125 ZZHC RX 250: Performed by: NURSE ANESTHETIST, CERTIFIED REGISTERED

## 2020-01-02 PROCEDURE — 81025 URINE PREGNANCY TEST: CPT | Performed by: ANESTHESIOLOGY

## 2020-01-02 PROCEDURE — 25000128 H RX IP 250 OP 636: Performed by: NURSE ANESTHETIST, CERTIFIED REGISTERED

## 2020-01-02 PROCEDURE — 40000277 XR SURGERY CARM FLUORO LESS THAN 5 MIN W STILLS

## 2020-01-02 PROCEDURE — 40000170 ZZH STATISTIC PRE-PROCEDURE ASSESSMENT II: Performed by: PHYSICAL MEDICINE & REHABILITATION

## 2020-01-02 PROCEDURE — 36000063 ZZH SURGERY LEVEL 4 EA 15 ADDTL MIN: Performed by: PHYSICAL MEDICINE & REHABILITATION

## 2020-01-02 PROCEDURE — 25000125 ZZHC RX 250

## 2020-01-02 DEVICE — LEAD NEUROSTIMULATOR KIT ST JUDE OCTRODE 60CM 3186: Type: IMPLANTABLE DEVICE | Site: BACK | Status: FUNCTIONAL

## 2020-01-02 RX ORDER — ONDANSETRON 2 MG/ML
4 INJECTION INTRAMUSCULAR; INTRAVENOUS EVERY 30 MIN PRN
Status: DISCONTINUED | OUTPATIENT
Start: 2020-01-02 | End: 2020-01-02 | Stop reason: HOSPADM

## 2020-01-02 RX ORDER — ONDANSETRON 2 MG/ML
INJECTION INTRAMUSCULAR; INTRAVENOUS PRN
Status: DISCONTINUED | OUTPATIENT
Start: 2020-01-02 | End: 2020-01-02

## 2020-01-02 RX ORDER — SUCRALFATE 1 G/1
1 TABLET ORAL 4 TIMES DAILY
COMMUNITY

## 2020-01-02 RX ORDER — PROPOFOL 10 MG/ML
INJECTION, EMULSION INTRAVENOUS CONTINUOUS PRN
Status: DISCONTINUED | OUTPATIENT
Start: 2020-01-02 | End: 2020-01-02

## 2020-01-02 RX ORDER — LIDOCAINE HYDROCHLORIDE 20 MG/ML
INJECTION, SOLUTION INFILTRATION; PERINEURAL PRN
Status: DISCONTINUED | OUTPATIENT
Start: 2020-01-02 | End: 2020-01-02

## 2020-01-02 RX ORDER — SODIUM CHLORIDE, SODIUM LACTATE, POTASSIUM CHLORIDE, CALCIUM CHLORIDE 600; 310; 30; 20 MG/100ML; MG/100ML; MG/100ML; MG/100ML
INJECTION, SOLUTION INTRAVENOUS CONTINUOUS PRN
Status: DISCONTINUED | OUTPATIENT
Start: 2020-01-02 | End: 2020-01-02

## 2020-01-02 RX ORDER — NALOXONE HYDROCHLORIDE 0.4 MG/ML
.1-.4 INJECTION, SOLUTION INTRAMUSCULAR; INTRAVENOUS; SUBCUTANEOUS
Status: DISCONTINUED | OUTPATIENT
Start: 2020-01-02 | End: 2020-01-02 | Stop reason: HOSPADM

## 2020-01-02 RX ORDER — FENTANYL CITRATE 50 UG/ML
INJECTION, SOLUTION INTRAMUSCULAR; INTRAVENOUS PRN
Status: DISCONTINUED | OUTPATIENT
Start: 2020-01-02 | End: 2020-01-02

## 2020-01-02 RX ORDER — HYDROXYZINE PAMOATE 25 MG/1
25 CAPSULE ORAL 3 TIMES DAILY PRN
COMMUNITY

## 2020-01-02 RX ORDER — HYDROCODONE BITARTRATE AND ACETAMINOPHEN 5; 325 MG/1; MG/1
1 TABLET ORAL ONCE
Status: COMPLETED | OUTPATIENT
Start: 2020-01-02 | End: 2020-01-02

## 2020-01-02 RX ORDER — DEXAMETHASONE SODIUM PHOSPHATE 4 MG/ML
INJECTION, SOLUTION INTRA-ARTICULAR; INTRALESIONAL; INTRAMUSCULAR; INTRAVENOUS; SOFT TISSUE PRN
Status: DISCONTINUED | OUTPATIENT
Start: 2020-01-02 | End: 2020-01-02

## 2020-01-02 RX ORDER — HYDROMORPHONE HYDROCHLORIDE 1 MG/ML
.3-.5 INJECTION, SOLUTION INTRAMUSCULAR; INTRAVENOUS; SUBCUTANEOUS EVERY 10 MIN PRN
Status: DISCONTINUED | OUTPATIENT
Start: 2020-01-02 | End: 2020-01-02 | Stop reason: HOSPADM

## 2020-01-02 RX ORDER — MAGNESIUM HYDROXIDE 1200 MG/15ML
LIQUID ORAL PRN
Status: DISCONTINUED | OUTPATIENT
Start: 2020-01-02 | End: 2020-01-02 | Stop reason: HOSPADM

## 2020-01-02 RX ORDER — CLINDAMYCIN PHOSPHATE 900 MG/50ML
900 INJECTION, SOLUTION INTRAVENOUS
Status: COMPLETED | OUTPATIENT
Start: 2020-01-02 | End: 2020-01-02

## 2020-01-02 RX ORDER — FENTANYL CITRATE 50 UG/ML
25-50 INJECTION, SOLUTION INTRAMUSCULAR; INTRAVENOUS EVERY 5 MIN PRN
Status: DISCONTINUED | OUTPATIENT
Start: 2020-01-02 | End: 2020-01-02 | Stop reason: HOSPADM

## 2020-01-02 RX ORDER — ALBUTEROL SULFATE 0.83 MG/ML
2.5 SOLUTION RESPIRATORY (INHALATION)
Status: DISCONTINUED | OUTPATIENT
Start: 2020-01-02 | End: 2020-01-02 | Stop reason: HOSPADM

## 2020-01-02 RX ORDER — LIDOCAINE 40 MG/G
CREAM TOPICAL
Status: DISCONTINUED | OUTPATIENT
Start: 2020-01-02 | End: 2020-01-02 | Stop reason: HOSPADM

## 2020-01-02 RX ORDER — DEXTROSE MONOHYDRATE 25 G/50ML
25-50 INJECTION, SOLUTION INTRAVENOUS
Status: DISCONTINUED | OUTPATIENT
Start: 2020-01-02 | End: 2020-01-02 | Stop reason: HOSPADM

## 2020-01-02 RX ORDER — SODIUM CHLORIDE, SODIUM LACTATE, POTASSIUM CHLORIDE, CALCIUM CHLORIDE 600; 310; 30; 20 MG/100ML; MG/100ML; MG/100ML; MG/100ML
INJECTION, SOLUTION INTRAVENOUS CONTINUOUS
Status: DISCONTINUED | OUTPATIENT
Start: 2020-01-02 | End: 2020-01-02 | Stop reason: HOSPADM

## 2020-01-02 RX ORDER — DEXAMETHASONE SODIUM PHOSPHATE 4 MG/ML
4 INJECTION, SOLUTION INTRA-ARTICULAR; INTRALESIONAL; INTRAMUSCULAR; INTRAVENOUS; SOFT TISSUE EVERY 10 MIN PRN
Status: DISCONTINUED | OUTPATIENT
Start: 2020-01-02 | End: 2020-01-02 | Stop reason: HOSPADM

## 2020-01-02 RX ORDER — DIMENHYDRINATE 50 MG/ML
12.5 INJECTION, SOLUTION INTRAMUSCULAR; INTRAVENOUS
Status: DISCONTINUED | OUTPATIENT
Start: 2020-01-02 | End: 2020-01-02 | Stop reason: HOSPADM

## 2020-01-02 RX ORDER — NICOTINE POLACRILEX 4 MG
15-30 LOZENGE BUCCAL
Status: DISCONTINUED | OUTPATIENT
Start: 2020-01-02 | End: 2020-01-02 | Stop reason: HOSPADM

## 2020-01-02 RX ORDER — ONDANSETRON 4 MG/1
4 TABLET, ORALLY DISINTEGRATING ORAL EVERY 30 MIN PRN
Status: DISCONTINUED | OUTPATIENT
Start: 2020-01-02 | End: 2020-01-02 | Stop reason: HOSPADM

## 2020-01-02 RX ADMIN — LIDOCAINE HYDROCHLORIDE 80 MG: 20 INJECTION, SOLUTION INFILTRATION; PERINEURAL at 09:29

## 2020-01-02 RX ADMIN — PROPOFOL 25 MCG/KG/MIN: 10 INJECTION, EMULSION INTRAVENOUS at 09:30

## 2020-01-02 RX ADMIN — SODIUM CHLORIDE, POTASSIUM CHLORIDE, SODIUM LACTATE AND CALCIUM CHLORIDE: 600; 310; 30; 20 INJECTION, SOLUTION INTRAVENOUS at 09:25

## 2020-01-02 RX ADMIN — HYDROCODONE BITARTRATE AND ACETAMINOPHEN 1 TABLET: 5; 325 TABLET ORAL at 11:35

## 2020-01-02 RX ADMIN — CLINDAMYCIN PHOSPHATE 900 MG: 900 INJECTION, SOLUTION INTRAVENOUS at 09:32

## 2020-01-02 RX ADMIN — FENTANYL CITRATE 50 MCG: 50 INJECTION, SOLUTION INTRAMUSCULAR; INTRAVENOUS at 10:42

## 2020-01-02 RX ADMIN — DEXAMETHASONE SODIUM PHOSPHATE 4 MG: 4 INJECTION, SOLUTION INTRA-ARTICULAR; INTRALESIONAL; INTRAMUSCULAR; INTRAVENOUS; SOFT TISSUE at 09:33

## 2020-01-02 RX ADMIN — DEXMEDETOMIDINE HYDROCHLORIDE 12 MCG: 100 INJECTION, SOLUTION INTRAVENOUS at 09:49

## 2020-01-02 RX ADMIN — DEXMEDETOMIDINE HYDROCHLORIDE 8 MCG: 100 INJECTION, SOLUTION INTRAVENOUS at 09:28

## 2020-01-02 RX ADMIN — MIDAZOLAM 1 MG: 1 INJECTION INTRAMUSCULAR; INTRAVENOUS at 09:26

## 2020-01-02 RX ADMIN — ONDANSETRON 4 MG: 2 INJECTION INTRAMUSCULAR; INTRAVENOUS at 10:17

## 2020-01-02 RX ADMIN — FENTANYL CITRATE 50 MCG: 50 INJECTION, SOLUTION INTRAMUSCULAR; INTRAVENOUS at 09:48

## 2020-01-02 ASSESSMENT — LIFESTYLE VARIABLES: TOBACCO_USE: 0

## 2020-01-02 ASSESSMENT — ENCOUNTER SYMPTOMS
SEIZURES: 0
DYSRHYTHMIAS: 0

## 2020-01-02 ASSESSMENT — MIFFLIN-ST. JEOR: SCORE: 2095.58

## 2020-01-02 NOTE — DISCHARGE INSTRUCTIONS
Follow up at clinic on 1/9 at 10:20.    Same Day Surgery Discharge Instructions for  Sedation and General Anesthesia       It's not unusual to feel dizzy, light-headed or faint for up to 24 hours after surgery or while taking pain medication.  If you have these symptoms: sit for a few minutes before standing and have someone assist you when you get up to walk or use the bathroom.      You should rest and relax for the next 24 hours. We recommend you make arrangements to have an adult stay with you for at least 24 hours after your discharge.  Avoid hazardous and strenuous activity.      DO NOT DRIVE any vehicle or operate mechanical equipment for 24 hours following the end of your surgery.  Even though you may feel normal, your reactions may be affected by the medication you have received.      Do not drink alcoholic beverages for 24 hours following surgery.       Slowly progress to your regular diet as you feel able. It's not unusual to feel nauseated and/or vomit after receiving anesthesia.  If you develop these symptoms, drink clear liquids (apple juice, ginger ale, broth, 7-up, etc. ) until you feel better.  If your nausea and vomiting persists for 24 hours, please notify your surgeon.        All narcotic pain medications, along with inactivity and anesthesia, can cause constipation. Drinking plenty of liquids and increasing fiber intake will help.      For any questions of a medical nature, call your surgeon.      Do not make important decisions for 24 hours.      If you had general anesthesia, you may have a sore throat for a couple of days related to the breathing tube used during surgery.  You may use Cepacol lozenges to help with this discomfort.  If it worsens or if you develop a fever, contact your surgeon.       If you feel your pain is not well managed with the pain medications prescribed by your surgeon, please contact your surgeon's office to let them know so they can address your concerns.        Spinal Cord Stimulator Implant Discharge Instruction      After Surgery:     Resume light activity as tolerated    Restrict bending, lifting and twisting to reduce the chance of your leads moving for 6-8 weeks    Resume your regular pain medication regimen    Begin taking your oral antibiotic the day after your surgery.  It is important you finish the entire course of medication until gone.    Driving is not recommended while your stimulator is turned on    A small amount of blood visible on the bandages is normal, if any leaks out of the bandage contact [facility]    Site Care:    Remove any bandages 48 hours after surgery.  A RN or your provider will remove the bandages at your follow-up appointment at [facility].    Your incisions have been closed with dissolvable stitches.  There are steri-strips covering your incision under your bandages.  Do not remove these, they will fall off on their own in 5-7 days.    After the bandages are removed, check your incision sites daily.  Keep sites clean and dry.    During the first few days, you may notice some swelling or discoloration around the incision sites, which is normal.  Some fluid (yellow to light red to orange) may ooze or leak from the incisions. However, if the fluid is foul smelling, thick, or does not decrease in amount, call [facility].    DO NOT shower until your bandages have been removed.    NO soaking your incisions for 2 weeks.  This includes baths, whirlpools, swimming pools, and hot tubs.    Call [facility] during business hours or the on-call call provider after hours if you develop any of the following:    Signs of infection such as: fevers, chills, increased pain, drainage (as mentioned above), redness, and swelling from your incision sites.    Headache persisting for more than 48 hours    Unusual changes in or stopping of sensation in your legs or back.     Uncontrolled pain.     Signs or  symptoms of a deep vein thrombosis (DVT) : swelling in one or both legs, pain or tenderness in one or both legs, warm skin on your leg, and/or red or discolored skin on your leg    [facility]      After hours on-call provider     Emergency Situations go to the Emergency Department or Call 911:    Sudden severe back pain    Sudden onset of leg weakness and spasms    Loss of bladder control and/or bowel function    Signs or symptoms or a pulmonary embolism (PE): sudden coughing --which may bring up blood, sharp chest pain, rapid breathing or shortness of breath, and/or severe light headedness

## 2020-01-02 NOTE — ANESTHESIA PREPROCEDURE EVALUATION
Anesthesia Pre-Procedure Evaluation    Patient: Marianne Millan   MRN: [id number] : 1975          Preoperative Diagnosis: Neuritis or radiculitis due to rupture of lumbar intervertebral disc [M51.16]    Procedure(s):  INSERTION, ELECTRODE LEAD AND PULSE GENERATOR, SPINAL CORD STIMULATOR, DORSAL COLUMN (ABBOTT)    Past Medical History:   Diagnosis Date     Hypertension      Obese      Sleep apnea      Past Surgical History:   Procedure Laterality Date     GASTRIC BYPASS       GI SURGERY      GASTRIC BYPASS     HYSTEROSCOPY,ABLATION ENDOMETRIUM       INSERT STIMULATOR DORSAL COLUMN TRIAL N/A 10/25/2019    Procedure: TRIAL, SPINAL CORD STIMULATOR;  Surgeon: Imer Hutson MD;  Location: [facility]     TUBAL LIGATION         Anesthesia Evaluation     . Pt has had prior anesthetic.     No history of anesthetic complications          ROS/MED HX    ENT/Pulmonary: Comment: Patient states that she has asthma    Quit smoking marijuana 4 weeks ago    (+)sleep apnea, Moderate Persistent asthma Treatment: Inhaler daily,  doesn't use CPAP , . .   (-) tobacco use   Neurologic:      (-) seizures, CVA and TIA   Cardiovascular:     (+) hypertension----. : . . . :. . Previous cardiac testing Echodate:2017results:1. This echocardiogram shows normal left ventricular systolic function. Left ventricular ejection fraction is 65%.    2. Normal diastology.    3. Trace mitral regurgitation.    4. Otherwise normal.date: results:ECG reviewed date:10/2019 results:SB at 48 bpm, inc. RBBB date: results:         (-) CAD, CHF and arrhythmias   METS/Exercise Tolerance:     Hematologic:         Musculoskeletal:         GI/Hepatic: Comment: S/p gastric bypass    (+) GERD      (-) liver disease   Renal/Genitourinary:      (-) renal disease   Endo: Comment: BMI 47    (+) Obesity, .   (-) Type I DM and Type II DM   Psychiatric:     (+) psychiatric history depression      Infectious Disease:         Malignancy:         Other: Comment:  "Fibromyalgia   (+) H/O Chronic Pain,                        Physical Exam  Normal systems: dental    Airway   Mallampati: II  TM distance: >3 FB  Neck ROM: full    Dental     Cardiovascular   Rhythm and rate: regular      Pulmonary    breath sounds clear to auscultation            Lab Results   Component Value Date    TSH 0.78 10/02/2017    HCG Negative 01/02/2020       Preop Vitals  BP Readings from Last 3 Encounters:   01/02/20 (!) 142/75   10/25/19 112/71   10/02/17 160/79    Pulse Readings from Last 3 Encounters:   10/25/19 (!) 46   10/02/17 94   04/09/14 63      Resp Readings from Last 3 Encounters:   01/02/20 16   10/25/19 16   10/02/17 28    SpO2 Readings from Last 3 Encounters:   01/02/20 97%   10/25/19 99%   10/02/17 99%      Temp Readings from Last 1 Encounters:   01/02/20 36.5  C (97.7  F) (Oral)    Ht Readings from Last 1 Encounters:   01/02/20 1.727 m (5' 8\")      Wt Readings from Last 1 Encounters:   01/02/20 139.7 kg (308 lb)    Estimated body mass index is 46.83 kg/m  as calculated from the following:    Height as of this encounter: 1.727 m (5' 8\").    Weight as of this encounter: 139.7 kg (308 lb).       Anesthesia Plan      History & Physical Review  History and physical reviewed and following examination; no interval change.    ASA Status:  3 .    NPO Status:  > 8 hours    Plan for MAC   PONV prophylaxis:  Ondansetron (or other 5HT-3)       Postoperative Care  Postoperative pain management:  Multi-modal analgesia.      Consents  Anesthetic plan, risks, benefits and alternatives discussed with:  Patient..                 Hermilo Hahn MD  "

## 2020-01-02 NOTE — ANESTHESIA POSTPROCEDURE EVALUATION
Patient: Marianne Millan    Procedure(s):  INSERTION, ELECTRODE LEAD AND PULSE GENERATOR, SPINAL CORD STIMULATOR, DORSAL COLUMN (ABBOTT)    Diagnosis:Neuritis or radiculitis due to rupture of lumbar intervertebral disc [M51.16]  Diagnosis Additional Information: No value filed.    Anesthesia Type:  MAC    Note:  Anesthesia Post Evaluation    Patient location during evaluation: Bedside  Patient participation: Able to fully participate in evaluation  Level of consciousness: awake and alert  Pain management: adequate  Airway patency: patent  Cardiovascular status: acceptable  Respiratory status: acceptable  Hydration status: acceptable  PONV: none             Last vitals:  Vitals:    01/02/20 1115 01/02/20 1130 01/02/20 1218   BP: (!) 153/85 (!) 146/79 110/73   Pulse: (!) 47 52    Resp: 12 10 12   Temp:      SpO2: 98% 98% 98%         Electronically Signed By: Hermilo Hahn MD  January 2, 2020  12:24 PM

## 2020-01-02 NOTE — ANESTHESIA CARE TRANSFER NOTE
Patient: Marianne Millan    Procedure(s):  INSERTION, ELECTRODE LEAD AND PULSE GENERATOR, SPINAL CORD STIMULATOR, DORSAL COLUMN (ABBOTT)    Diagnosis: Neuritis or radiculitis due to rupture of lumbar intervertebral disc [M51.16]  Diagnosis Additional Information: No value filed.    Anesthesia Type:   MAC     Note:  Airway :Nasal Cannula  Patient transferred to:PACU  Comments: Vital signs stable on 3L NC, alert and talking, report given to RN before transfer of patient care. Handoff Report: Identifed the Patient, Identified the Reponsible Provider, Reviewed the pertinent medical history, Discussed the surgical course, Reviewed Intra-OP anesthesia mangement and issues during anesthesia, Set expectations for post-procedure period and Allowed opportunity for questions and acknowledgement of understanding      Vitals: (Last set prior to Anesthesia Care Transfer)    CRNA VITALS  1/2/2020 1010 - 1/2/2020 1045      1/2/2020             Pulse:  58    SpO2:  92 %    Resp Rate (set):  10                Electronically Signed By: TANIA Irby CRNA  January 2, 2020  10:45 AM

## 2020-01-02 NOTE — OR NURSING
Dr. Zuluaga notified of patient's report of pain to right great toe, pressure. Denies numbness and tingling, equal strength to feet. Normal finding, possibly due to nerve root irritation.

## 2020-01-03 NOTE — OP NOTE
Name:    Marianne Millan   :    1975  Location:   [facility]  Procedure date: 2020 09:30 AM  Procedure:    [employer]   Spinal Cord Stimulator Implant  Indication:  Intervertebral disc disorders w radiculopathy, lumbar region M51.16      Allergies:  Ingredient Reaction (Severity) Medication Name Comment   DIPHENHYDRAMINE HCL swollen face Benadryl    LATEX swollen face         This procedure was performed using MAC sedation due to the patient's anxiety.    Anesthesia: MAC and Local.    Implants: All implants were [employer]) products. The lead serial numbers were [id number] and [id number]. The implantable pulse generator was serial number [id number].    Description of Procedure:  After discussing potential risks and benefits, the patient did wish to proceed and signed a written consent form. The pocket site was marked in the preoperative area. An antibiotic prescription was given to the patient to use for the next 10 days for prophylaxis. The patient was brought into the operating room and positioned prone on the operating table taking care relieve all pressure points and place extremities in a comfortable position. MAC anesthesia was induced. The operative field was prepped and draped in normal sterile fashion. The skin was anesthetized with Xylocaine at the appropriate level as identified fluoroscopically.  A paramedian incision was then made in the mid back. A 14 gauge Tuohy needle was then introduced under fluoroscopy. The left T12-L1 epidural space was entered as a loss of resistance was felt with an air filled syringe. The 8 contact compact spinal cord stimulator lead was advanced 3 contacts to the T7 vertebral level. This lead was midline.  A second 8 contact compact spinal cord stimulator lead was placed through the right T12-L1 epidural space and advanced 2 contacts to the T7 vertebral level. This lead was left of midline. Lateral and AP fluoroscopy views were obtained  and confirmed appropriate posterior epidural placement.      The anchors were placed and tightened around the leads using and then to the fascia using 2.0 silk sutures. A superficial gluteal pocket was developed on the right side.  A subcutaneous tunnel was made and the cables were pulled through to the gluteal pocket. The cable ends were connected to the IPG and all connections were secured to torque. An impedance check was performed and all impedances were within normal limits. The IPG and excess cable were then placed into the gluteal pocket with the excess cable coiled behind the IPG. The wounds were then irrigated profusely with saline solution and Vancomycin powder was dispersed between the two incision sites. The incisions were closed in layers using absorbable sutures. Steri-strips were used as well as sutures. Final X-ray revealed no significant changes in lead positions.      The patient was awakened, positioned supine and delivered to the recovery  room. Discharge post-op care instructions were reviewed with the patient, and the patient verbalized understanding and was provided with a written copy. IV was discontinued with catheter intact and patient was then discharged.     Lavern Zuluaga DO   01/02/2020 09:30 AM

## 2021-01-25 ENCOUNTER — HOSPITAL ENCOUNTER (OUTPATIENT)
Facility: CLINIC | Age: 46
End: 2021-01-25
Attending: PHYSICAL MEDICINE & REHABILITATION | Admitting: PHYSICAL MEDICINE & REHABILITATION
Payer: COMMERCIAL

## 2021-02-15 RX ORDER — NICOTINE POLACRILEX 4 MG
15-30 LOZENGE BUCCAL
Status: CANCELLED | OUTPATIENT
Start: 2021-02-15

## 2021-02-15 RX ORDER — DEXTROSE MONOHYDRATE 25 G/50ML
25-50 INJECTION, SOLUTION INTRAVENOUS
Status: CANCELLED | OUTPATIENT
Start: 2021-02-15

## 2021-02-15 RX ORDER — LIDOCAINE 40 MG/G
CREAM TOPICAL
Status: CANCELLED | OUTPATIENT
Start: 2021-02-15

## 2021-02-15 RX ORDER — CLINDAMYCIN PHOSPHATE 900 MG/50ML
900 INJECTION, SOLUTION INTRAVENOUS
Status: CANCELLED | OUTPATIENT
Start: 2021-02-15

## 2021-02-17 RX ORDER — METAXALONE 800 MG/1
800 TABLET ORAL EVERY 8 HOURS PRN
COMMUNITY
End: 2021-02-17 | Stop reason: HOSPADM

## 2021-02-17 RX ORDER — LEVOCARNITINE 330 MG/1
TABLET ORAL 2 TIMES DAILY
COMMUNITY
End: 2021-02-17 | Stop reason: HOSPADM

## 2021-02-17 RX ORDER — HYDROXYZINE HYDROCHLORIDE 25 MG/1
25 TABLET, FILM COATED ORAL 3 TIMES DAILY PRN
COMMUNITY
End: 2021-02-17 | Stop reason: HOSPADM

## 2021-02-17 RX ORDER — METHOCARBAMOL 500 MG/1
500 TABLET, FILM COATED ORAL
COMMUNITY
End: 2021-02-17 | Stop reason: HOSPADM

## 2021-02-28 ENCOUNTER — HEALTH MAINTENANCE LETTER (OUTPATIENT)
Age: 46
End: 2021-02-28

## 2021-03-29 DIAGNOSIS — Z11.59 ENCOUNTER FOR SCREENING FOR OTHER VIRAL DISEASES: ICD-10-CM

## 2021-04-14 RX ORDER — VALACYCLOVIR HYDROCHLORIDE 1 G/1
1000 TABLET, FILM COATED ORAL 3 TIMES DAILY
COMMUNITY

## 2021-04-14 RX ORDER — CEPHALEXIN 500 MG/1
500 CAPSULE ORAL
COMMUNITY

## 2021-04-14 RX ORDER — METAXALONE 800 MG/1
800 TABLET ORAL
COMMUNITY

## 2021-04-15 ENCOUNTER — ANESTHESIA (OUTPATIENT)
Dept: SURGERY | Facility: CLINIC | Age: 46
End: 2021-04-15
Payer: COMMERCIAL

## 2021-04-15 ENCOUNTER — HOSPITAL ENCOUNTER (OUTPATIENT)
Facility: CLINIC | Age: 46
Discharge: HOME | End: 2021-04-15
Attending: PSYCHIATRY & NEUROLOGY | Admitting: PSYCHIATRY & NEUROLOGY
Payer: COMMERCIAL

## 2021-04-15 ENCOUNTER — ANESTHESIA EVENT (OUTPATIENT)
Dept: SURGERY | Facility: CLINIC | Age: 46
End: 2021-04-15
Payer: COMMERCIAL

## 2021-04-15 ENCOUNTER — APPOINTMENT (OUTPATIENT)
Dept: GENERAL RADIOLOGY | Facility: CLINIC | Age: 46
End: 2021-04-15
Attending: PSYCHIATRY & NEUROLOGY
Payer: COMMERCIAL

## 2021-04-15 VITALS
OXYGEN SATURATION: 97 % | WEIGHT: 293 LBS | BODY MASS INDEX: 44.41 KG/M2 | TEMPERATURE: 97.7 F | HEART RATE: 60 BPM | HEIGHT: 68 IN | RESPIRATION RATE: 16 BRPM | SYSTOLIC BLOOD PRESSURE: 126 MMHG | DIASTOLIC BLOOD PRESSURE: 88 MMHG

## 2021-04-15 LAB — HCG UR QL: NEGATIVE

## 2021-04-15 PROCEDURE — 258N000003 HC RX IP 258 OP 636: Performed by: NURSE ANESTHETIST, CERTIFIED REGISTERED

## 2021-04-15 PROCEDURE — 360N000077 HC SURGERY LEVEL 4, PER MIN: Performed by: PSYCHIATRY & NEUROLOGY

## 2021-04-15 PROCEDURE — 250N000013 HC RX MED GY IP 250 OP 250 PS 637: Performed by: ANESTHESIOLOGY

## 2021-04-15 PROCEDURE — C1778 LEAD, NEUROSTIMULATOR: HCPCS | Performed by: PSYCHIATRY & NEUROLOGY

## 2021-04-15 PROCEDURE — 250N000009 HC RX 250: Performed by: PSYCHIATRY & NEUROLOGY

## 2021-04-15 PROCEDURE — 272N000001 HC OR GENERAL SUPPLY STERILE: Performed by: PSYCHIATRY & NEUROLOGY

## 2021-04-15 PROCEDURE — 999N000181 XR SURGERY CARM FLUORO GREATER THAN 5 MIN W STILLS

## 2021-04-15 PROCEDURE — 999N000141 HC STATISTIC PRE-PROCEDURE NURSING ASSESSMENT: Performed by: PSYCHIATRY & NEUROLOGY

## 2021-04-15 PROCEDURE — 272N000002 HC OR SUPPLY OTHER OPNP: Performed by: PSYCHIATRY & NEUROLOGY

## 2021-04-15 PROCEDURE — 710N000012 HC RECOVERY PHASE 2, PER MINUTE: Performed by: PSYCHIATRY & NEUROLOGY

## 2021-04-15 PROCEDURE — 250N000011 HC RX IP 250 OP 636: Performed by: NURSE ANESTHETIST, CERTIFIED REGISTERED

## 2021-04-15 PROCEDURE — 370N000017 HC ANESTHESIA TECHNICAL FEE, PER MIN: Performed by: PSYCHIATRY & NEUROLOGY

## 2021-04-15 PROCEDURE — 81025 URINE PREGNANCY TEST: CPT | Performed by: ANESTHESIOLOGY

## 2021-04-15 PROCEDURE — 710N000009 HC RECOVERY PHASE 1, LEVEL 1, PER MIN: Performed by: PSYCHIATRY & NEUROLOGY

## 2021-04-15 PROCEDURE — C1713 ANCHOR/SCREW BN/BN,TIS/BN: HCPCS | Performed by: PSYCHIATRY & NEUROLOGY

## 2021-04-15 PROCEDURE — 250N000009 HC RX 250: Performed by: NURSE ANESTHETIST, CERTIFIED REGISTERED

## 2021-04-15 PROCEDURE — 250N000011 HC RX IP 250 OP 636: Performed by: ANESTHESIOLOGY

## 2021-04-15 DEVICE — IMPLANTABLE DEVICE: Type: IMPLANTABLE DEVICE | Site: BACK | Status: FUNCTIONAL

## 2021-04-15 DEVICE — LEAD NEUROSTIMULATOR KIT ST JUDE OCTRODE 60CM 3186: Type: IMPLANTABLE DEVICE | Site: BACK | Status: FUNCTIONAL

## 2021-04-15 RX ORDER — NALOXONE HYDROCHLORIDE 0.4 MG/ML
0.4 INJECTION, SOLUTION INTRAMUSCULAR; INTRAVENOUS; SUBCUTANEOUS
Status: DISCONTINUED | OUTPATIENT
Start: 2021-04-15 | End: 2021-04-15 | Stop reason: HOSPADM

## 2021-04-15 RX ORDER — EPHEDRINE SULFATE 50 MG/ML
INJECTION, SOLUTION INTRAMUSCULAR; INTRAVENOUS; SUBCUTANEOUS PRN
Status: DISCONTINUED | OUTPATIENT
Start: 2021-04-15 | End: 2021-04-15

## 2021-04-15 RX ORDER — LIDOCAINE 40 MG/G
CREAM TOPICAL
Status: DISCONTINUED | OUTPATIENT
Start: 2021-04-15 | End: 2021-04-15 | Stop reason: HOSPADM

## 2021-04-15 RX ORDER — ONDANSETRON 2 MG/ML
INJECTION INTRAMUSCULAR; INTRAVENOUS PRN
Status: DISCONTINUED | OUTPATIENT
Start: 2021-04-15 | End: 2021-04-15

## 2021-04-15 RX ORDER — ONDANSETRON 2 MG/ML
4 INJECTION INTRAMUSCULAR; INTRAVENOUS EVERY 30 MIN PRN
Status: DISCONTINUED | OUTPATIENT
Start: 2021-04-15 | End: 2021-04-15 | Stop reason: HOSPADM

## 2021-04-15 RX ORDER — LIDOCAINE HYDROCHLORIDE 20 MG/ML
INJECTION, SOLUTION INFILTRATION; PERINEURAL PRN
Status: DISCONTINUED | OUTPATIENT
Start: 2021-04-15 | End: 2021-04-15

## 2021-04-15 RX ORDER — SODIUM CHLORIDE, SODIUM LACTATE, POTASSIUM CHLORIDE, CALCIUM CHLORIDE 600; 310; 30; 20 MG/100ML; MG/100ML; MG/100ML; MG/100ML
INJECTION, SOLUTION INTRAVENOUS CONTINUOUS PRN
Status: DISCONTINUED | OUTPATIENT
Start: 2021-04-15 | End: 2021-04-15

## 2021-04-15 RX ORDER — LIDOCAINE 40 MG/G
CREAM TOPICAL
Status: DISCONTINUED | OUTPATIENT
Start: 2021-04-15 | End: 2021-04-15

## 2021-04-15 RX ORDER — PROPOFOL 10 MG/ML
INJECTION, EMULSION INTRAVENOUS CONTINUOUS PRN
Status: DISCONTINUED | OUTPATIENT
Start: 2021-04-15 | End: 2021-04-15

## 2021-04-15 RX ORDER — MELOXICAM 15 MG/1
15 TABLET ORAL DAILY
COMMUNITY

## 2021-04-15 RX ORDER — NALOXONE HYDROCHLORIDE 0.4 MG/ML
0.2 INJECTION, SOLUTION INTRAMUSCULAR; INTRAVENOUS; SUBCUTANEOUS
Status: DISCONTINUED | OUTPATIENT
Start: 2021-04-15 | End: 2021-04-15 | Stop reason: HOSPADM

## 2021-04-15 RX ORDER — FENTANYL CITRATE 50 UG/ML
INJECTION, SOLUTION INTRAMUSCULAR; INTRAVENOUS PRN
Status: DISCONTINUED | OUTPATIENT
Start: 2021-04-15 | End: 2021-04-15

## 2021-04-15 RX ORDER — OXYCODONE HYDROCHLORIDE 5 MG/1
10 TABLET ORAL ONCE
Status: COMPLETED | OUTPATIENT
Start: 2021-04-15 | End: 2021-04-15

## 2021-04-15 RX ORDER — HYDROMORPHONE HYDROCHLORIDE 1 MG/ML
.3-.5 INJECTION, SOLUTION INTRAMUSCULAR; INTRAVENOUS; SUBCUTANEOUS EVERY 10 MIN PRN
Status: DISCONTINUED | OUTPATIENT
Start: 2021-04-15 | End: 2021-04-15 | Stop reason: HOSPADM

## 2021-04-15 RX ORDER — ONDANSETRON 4 MG/1
4 TABLET, ORALLY DISINTEGRATING ORAL EVERY 30 MIN PRN
Status: DISCONTINUED | OUTPATIENT
Start: 2021-04-15 | End: 2021-04-15 | Stop reason: HOSPADM

## 2021-04-15 RX ORDER — FENTANYL CITRATE 0.05 MG/ML
25-50 INJECTION, SOLUTION INTRAMUSCULAR; INTRAVENOUS
Status: DISCONTINUED | OUTPATIENT
Start: 2021-04-15 | End: 2021-04-15 | Stop reason: HOSPADM

## 2021-04-15 RX ORDER — CLINDAMYCIN PHOSPHATE 900 MG/50ML
900 INJECTION, SOLUTION INTRAVENOUS
Status: COMPLETED | OUTPATIENT
Start: 2021-04-15 | End: 2021-04-15

## 2021-04-15 RX ORDER — SODIUM CHLORIDE, SODIUM LACTATE, POTASSIUM CHLORIDE, CALCIUM CHLORIDE 600; 310; 30; 20 MG/100ML; MG/100ML; MG/100ML; MG/100ML
INJECTION, SOLUTION INTRAVENOUS CONTINUOUS
Status: DISCONTINUED | OUTPATIENT
Start: 2021-04-15 | End: 2021-04-15 | Stop reason: HOSPADM

## 2021-04-15 RX ADMIN — HYDROMORPHONE HYDROCHLORIDE 0.5 MG: 1 INJECTION, SOLUTION INTRAMUSCULAR; INTRAVENOUS; SUBCUTANEOUS at 15:25

## 2021-04-15 RX ADMIN — FENTANYL CITRATE 50 MCG: 50 INJECTION, SOLUTION INTRAMUSCULAR; INTRAVENOUS at 11:36

## 2021-04-15 RX ADMIN — ONDANSETRON 4 MG: 2 INJECTION INTRAMUSCULAR; INTRAVENOUS at 15:05

## 2021-04-15 RX ADMIN — MIDAZOLAM 2 MG: 1 INJECTION INTRAMUSCULAR; INTRAVENOUS at 11:31

## 2021-04-15 RX ADMIN — LIDOCAINE HYDROCHLORIDE 40 MG: 20 INJECTION, SOLUTION INFILTRATION; PERINEURAL at 11:36

## 2021-04-15 RX ADMIN — FENTANYL CITRATE 12.5 MCG: 50 INJECTION, SOLUTION INTRAMUSCULAR; INTRAVENOUS at 12:54

## 2021-04-15 RX ADMIN — ONDANSETRON 4 MG: 2 INJECTION INTRAMUSCULAR; INTRAVENOUS at 11:36

## 2021-04-15 RX ADMIN — SODIUM CHLORIDE, POTASSIUM CHLORIDE, SODIUM LACTATE AND CALCIUM CHLORIDE: 600; 310; 30; 20 INJECTION, SOLUTION INTRAVENOUS at 11:30

## 2021-04-15 RX ADMIN — FENTANYL CITRATE 25 MCG: 50 INJECTION, SOLUTION INTRAMUSCULAR; INTRAVENOUS at 13:36

## 2021-04-15 RX ADMIN — Medication 5 MG: at 12:08

## 2021-04-15 RX ADMIN — PROPOFOL 100 MCG/KG/MIN: 10 INJECTION, EMULSION INTRAVENOUS at 11:36

## 2021-04-15 RX ADMIN — OXYCODONE HYDROCHLORIDE 10 MG: 5 TABLET ORAL at 15:02

## 2021-04-15 RX ADMIN — FENTANYL CITRATE 12.5 MCG: 50 INJECTION, SOLUTION INTRAMUSCULAR; INTRAVENOUS at 11:54

## 2021-04-15 RX ADMIN — DEXMEDETOMIDINE HYDROCHLORIDE 8 MCG: 100 INJECTION, SOLUTION INTRAVENOUS at 11:39

## 2021-04-15 RX ADMIN — FENTANYL CITRATE 50 MCG: 0.05 INJECTION, SOLUTION INTRAMUSCULAR; INTRAVENOUS at 14:41

## 2021-04-15 RX ADMIN — DEXMEDETOMIDINE HYDROCHLORIDE 8 MCG: 100 INJECTION, SOLUTION INTRAVENOUS at 11:52

## 2021-04-15 RX ADMIN — DEXMEDETOMIDINE HYDROCHLORIDE 4 MCG: 100 INJECTION, SOLUTION INTRAVENOUS at 11:54

## 2021-04-15 RX ADMIN — CLINDAMYCIN PHOSPHATE 900 MG: 900 INJECTION, SOLUTION INTRAVENOUS at 11:32

## 2021-04-15 ASSESSMENT — MIFFLIN-ST. JEOR: SCORE: 2321.91

## 2021-04-15 ASSESSMENT — ENCOUNTER SYMPTOMS: SEIZURES: 0

## 2021-04-15 NOTE — ANESTHESIA POSTPROCEDURE EVALUATION
Patient: Marianne Millan    Procedure(s):  SPINAL CORD STIMULATOR GENERATOR BATTERY POCKET REVISION AND LEAD REVISION    Diagnosis:Intervertebral disc disorder with radiculopathy of lumbar region [M51.16]  Diagnosis Additional Information: No value filed.    Anesthesia Type:  MAC    Note:  Disposition: Outpatient   Postop Pain Control: Uneventful            Sign Out: Well controlled pain   PONV: No   Neuro/Psych: Uneventful            Sign Out: Acceptable/Baseline neuro status   Airway/Respiratory: Uneventful            Sign Out: Acceptable/Baseline resp. status   CV/Hemodynamics: Uneventful            Sign Out: Acceptable CV status   Other NRE: NONE   DID A NON-ROUTINE EVENT OCCUR? No         Last vitals:  Vitals:    04/15/21 1500 04/15/21 1515 04/15/21 1530   BP: 119/72 103/62 (!) 129/97   Pulse: 53 57 65   Resp: 14 16 18   Temp:  36.5  C (97.7  F)    SpO2: 95% 97% 97%       Last vitals prior to Anesthesia Care Transfer:  CRNA VITALS  4/15/2021 1353 - 4/15/2021 1453      4/15/2021             NIBP:  114/70    Pulse:  56    SpO2:  100 %    EKG:  Sinus bradycardia          Electronically Signed By: Sherman Jeff MD  April 15, 2021  3:36 PM

## 2021-04-15 NOTE — DISCHARGE INSTRUCTIONS
Same Day Surgery Discharge Instructions for  Sedation and General Anesthesia       It's not unusual to feel dizzy, light-headed or faint for up to 24 hours after surgery or while taking pain medication.  If you have these symptoms: sit for a few minutes before standing and have someone assist you when you get up to walk or use the bathroom.      You should rest and relax for the next 24 hours. We recommend you make arrangements to have an adult stay with you for at least 24 hours after your discharge.  Avoid hazardous and strenuous activity.      DO NOT DRIVE any vehicle or operate mechanical equipment for 24 hours following the end of your surgery.  Even though you may feel normal, your reactions may be affected by the medication you have received.      Do not drink alcoholic beverages for 24 hours following surgery.       Slowly progress to your regular diet as you feel able. It's not unusual to feel nauseated and/or vomit after receiving anesthesia.  If you develop these symptoms, drink clear liquids (apple juice, ginger ale, broth, 7-up, etc. ) until you feel better.  If your nausea and vomiting persists for 24 hours, please notify your surgeon.        All narcotic pain medications, along with inactivity and anesthesia, can cause constipation. Drinking plenty of liquids and increasing fiber intake will help.      For any questions of a medical nature, call your surgeon.      Do not make important decisions for 24 hours.      If you had general anesthesia, you may have a sore throat for a couple of days related to the breathing tube used during surgery.  You may use Cepacol lozenges to help with this discomfort.  If it worsens or if you develop a fever, contact your surgeon.       If you feel your pain is not well managed with the pain medications prescribed by your surgeon, please contact your surgeon's office to let them know so they can address your concerns.       CoVid 19 Information    We want to give you  information regarding Covid. Please consult your primary care provider with any questions you might have.     Patient who have symptoms (cough, fever, or shortness of breath), need to isolate for 7 days from when symptoms started OR 72 hours after fever resolves (without fever reducing medications) AND improvement of respiratory symptoms (whichever is longer).      Isolate yourself at home (in own room/own bathroom if possible)    Do Not allow any visitors    Do Not go to work or school    Do Not go to [employer],  centers, shopping, or other public places.    Do Not shake hands.    Avoid close and intimate contact with others (hugging, kissing).    Follow CDC recommendations for household cleaning of frequently touched services.     After the initial 7 days, continue to isolate yourself from household members as much as possible. To continue decrease the risk of community spread and exposure, you and any members of your household should limit activities in public for 14 days after starting home isolation.     You can reference the following CDC link for helpful home isolation/care tips:  https://www.cdc.gov/coronavirus/2019-ncov/downloads/10Things.pdf    Protect Others:    Cover Your Mouth and Nose with a mask, disposable tissue or wash cloth to avoid spreading germs to others.    Wash your hands and face frequently with soap and water    Call Your Primary Doctor If: Breathing difficulty develops or you become worse.    For more information about COVID19 and options for caring for yourself at home, please visit the CDC website at https://www.cdc.gov/coronavirus/2019-ncov/about/steps-when-sick.html  For more options for care at [facility], please visit our website at [email or web address]    Spinal Cord Stimulator Implant Discharge Instruction      After Surgery:     Resume light activity as tolerated    Restrict bending, lifting and twisting to reduce the chance of your leads  moving for 6-8 weeks    Resume your regular pain medication regimen    Begin taking your oral antibiotic the day after your surgery.  It is important you finish the entire course of medication until gone.    Driving is not recommended while your stimulator is turned on    A small amount of blood visible on the bandages is normal, if any leaks out of the bandage contact [facility]    Site Care:    DO NOT remove any bandages from the surgery.  A RN or your provider will remove the bandages at your follow-up appointment at [facility].    Your incisions have been closed with dissolvable stitches.  There are steri-strips covering your incision under your bandages.  Do not remove these, they will fall off on their own in 5-7 days.    After the bandages are removed, check your incision sites daily.  Keep sites clean and dry.    During the first few days, you may notice some swelling or discoloration around the incision sites, which is normal.  Some fluid (yellow to light red to orange) may ooze or leak from the incisions. However, if the fluid is foul smelling, thick, or does not decrease in amount, call [facility].    DO NOT shower until your bandages have been removed.    NO soaking your incisions for 2 weeks.  This includes baths, whirlpools, swimming pools, and hot tubs.    Call [facility] during business hours or the on-call call provider after hours if you develop any of the following:    Signs of infection such as: fevers, chills, increased pain, drainage (as mentioned above), redness, and swelling from your incision sites.    Headache persisting for more than 48 hours    Unusual changes in or stopping of sensation in your legs or back.     Uncontrolled pain.     Signs or symptoms of a deep vein thrombosis (DVT) : swelling in one orbrboth legs, pain or tenderness in one or both legs, warm skin on your leg, and/or red or discolored skin on your leg    [facility]      After hours on-call provider     Emergency Situations go to the Emergency Department or Call 911:    Sudden severe back pain    Sudden onset of leg weakness and spasms    Loss of bladder control and/or bowel function    Signs or symptoms or a pulmonary embolism (PE): sudden coughing --which may bring up blood, sharp chest pain, rapid breathing or shortness of breath, and/or severe light headedness

## 2021-04-15 NOTE — ANESTHESIA CARE TRANSFER NOTE
Patient: Marianne Millan    Procedure(s):  SPINAL CORD STIMULATOR GENERATOR BATTERY POCKET REVISION AND LEAD REVISION    Diagnosis: Intervertebral disc disorder with radiculopathy of lumbar region [M51.16]  Diagnosis Additional Information: No value filed.    Anesthesia Type:   MAC     Note:    Oropharynx: oropharynx clear of all foreign objects  Level of Consciousness: awake and drowsy  Oxygen Supplementation: face mask  Level of Supplemental Oxygen (L/min / FiO2): 6  Independent Airway: airway patency satisfactory and stable  Dentition: dentition unchanged  Vital Signs Stable: post-procedure vital signs reviewed and stable  Report to RN Given: handoff report given  Patient transferred to: PACU  Comments: To [facility] PACU: Awake, good airway, 02 face mask, VSS  Report to RN  Handoff Report: Identifed the Patient, Identified the Reponsible Provider, Reviewed the pertinent medical history, Discussed the surgical course, Reviewed Intra-OP anesthesia mangement and issues during anesthesia, Set expectations for post-procedure period and Allowed opportunity for questions and acknowledgement of understanding      Vitals: (Last set prior to Anesthesia Care Transfer)  CRNA VITALS  4/15/2021 1353 - 4/15/2021 1431      4/15/2021             Resp Rate (set):  10        Electronically Signed By: TANIA Rdogers CRNA  April 15, 2021  2:31 PM

## 2021-04-15 NOTE — ANESTHESIA PREPROCEDURE EVALUATION
Anesthesia Pre-Procedure Evaluation    Patient: Marianne Millan   MRN: [id number] : 1975        Preoperative Diagnosis: Intervertebral disc disorder with radiculopathy of lumbar region [M51.16]   Procedure : Procedure(s):  SPINAL CORD STIMULATOR GENERATOR BATTERY POCKET REVISION AND LEAD REVISION     Past Medical History:   Diagnosis Date     Anemia      Fibromyalgia      Headache      Hypertension      IFG (impaired fasting glucose)      Intervertebral disc disorders with radiculopathy, lumbar region      Migraine      Migraine      Monoallelic mutation of POLG gene      Myopathy      Obese      Other chronic pain      Pain disorder with related psychological factor      PONV (postoperative nausea and vomiting)      Sleep apnea      Uncomplicated asthma       Past Surgical History:   Procedure Laterality Date     GASTRIC BYPASS       GI SURGERY      GASTRIC BYPASS     HYSTEROSCOPY,ABLATION ENDOMETRIUM       INSERT STIMULATOR AND LEADS INTERNAL DORSAL COLUMN N/A 2020    Procedure: INSERTION, ELECTRODE LEAD AND PULSE GENERATOR, SPINAL CORD STIMULATOR, DORSAL COLUMN (KOENIG);  Surgeon: Lavern Zuluaga DO;  Location:  OR     INSERT STIMULATOR DORSAL COLUMN TRIAL N/A 10/25/2019    Procedure: TRIAL, SPINAL CORD STIMULATOR;  Surgeon: Imer Hutson MD;  Location:  OR     TUBAL LIGATION        Allergies   Allergen Reactions     Hydrochlorothiazide Shortness Of Breath     Lisinopril Swelling     Other reaction(s): Swelling  Facial Swelling     Meperidine Hives     Norco [Hydrocodone-Acetaminophen]      Other reaction(s): Other  Severe headache  headache     Amitriptyline      Other reaction(s): Other  RAPID HEART RATE  Heart palpitations     Benadryl Allergy Decongestant  [Allergy Decongestant] Hives     Ceftin [Cefuroxime]      Other reaction(s): Other  Dizziness, lightheaded  Dizziness, lightheaded     Diphenhydramine      Gabapentin      Latex      Other reaction(s): Swelling      Metoclopramide      Other reaction(s): Other  akathisia     Tramadol      Other reaction(s): Unknown Reaction  Pt stated being very agitated and last time in hospital needed two doses of ativan to calm her down.      Nsaids      Other reaction(s): Other  History of gastric bypass surgery      Social History     Tobacco Use     Smoking status: Never Smoker     Smokeless tobacco: Never Used   Substance Use Topics     Alcohol use: Yes     Alcohol/week: 1.0 - 2.0 standard drinks     Types: 1 - 2 Glasses of wine per week     Comment: YEARLY      Wt Readings from Last 1 Encounters:   04/15/21 (!) 162.8 kg (359 lb)        Anesthesia Evaluation   Pt has had prior anesthetic.     History of anesthetic complications   slow to wake up.    ROS/MED HX  ENT/Pulmonary:     (+) sleep apnea, doesn't use CPAP, Mild Persistent, asthma Treatment: Inhaler prn,      Neurologic:    (-) no seizures   Cardiovascular:     (+) hypertension-----    METS/Exercise Tolerance:     Hematologic:     (+) anemia,     Musculoskeletal:       GI/Hepatic:    (-) GERD and liver disease   Renal/Genitourinary:    (-) renal disease   Endo:     (+) Obesity (BMI 55),  (-) Type II DM and thyroid disease   Psychiatric/Substance Use:     (+) psychiatric history depression     Infectious Disease:       Malignancy:       Other:      (+) , H/O Chronic Pain,        Physical Exam    Airway        Mallampati: II   TM distance: > 3 FB   Neck ROM: full   Mouth opening: > 3 cm    Respiratory Devices and Support         Dental  no notable dental history         Cardiovascular   cardiovascular exam normal          Pulmonary   pulmonary exam normal                OUTSIDE LABS:  CBC: No results found for: WBC, HGB, HCT, PLT  BMP: No results found for: NA, POTASSIUM, CHLORIDE, CO2, BUN, CR, GLC  COAGS: No results found for: PTT, INR, FIBR  POC:   Lab Results   Component Value Date    HCG Negative 04/15/2021     HEPATIC: No results found for: ALBUMIN, PROTTOTAL, ALT, AST, GGT,  ALKPHOS, BILITOTAL, BILIDIRECT, KOFI  OTHER:   Lab Results   Component Value Date    LACT 1.2 10/02/2017    TSH 0.78 10/02/2017       Anesthesia Plan    ASA Status:  3      Anesthesia Type: MAC.     - Reason for MAC: straight local not clinically adequate              Consents    Anesthesia Plan(s) and associated risks, benefits, and realistic alternatives discussed. Questions answered and patient/representative(s) expressed understanding.     - Discussed with:  Patient         Postoperative Care    Pain management: Multi-modal analgesia.   PONV prophylaxis: Ondansetron (or other 5HT-3)     Comments:                Sherman Jeff MD

## 2021-04-22 NOTE — OP NOTE
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 The patient wished to proceed and written, informed consent was obtained.     Due to the patient's anxiety and pain, MAC sedation was utilized. A pre-procedural evaluation was performed and reviewed by the surgeon and the anesthesia provider. The patient was assessed as a good candidate for MAC sedation.   Implants/Explants: All implants/explants were Abbott products. Lead serial numbers were [id number] and [id number].    Description of Procedure: The operative site was marked in the preoperative area. IV access was obtained via a registered nurse and 3g ANCEF mixed in normal saline was administered through the IV. The patient was brought into the operating room and positioned prone on the operating table taking care to relieve all pressure points and place extremities in a comfortable position. The skin was prepped using a surgical solution and was draped in the usual sterile fashion. A time out was performed stating the patient's name, date of birth, allergies, and procedure to be performed. The appropriate level was identified fluoroscopically.   MAC sedation was administered in increments throughout the procedure which resulted in satisfactory relaxation and sedation. Fluids ran through the IV over the course of the procedure. The patient was monitored throughout the procedure by the anesthesia provider. Physiologic parameters were observed utilizing pulse oximetry and blood pressure checks during the procedure.     The skin was anesthetized with Lidocaine 1% with epinephrine. The existing right gluteal  pocket with the battery was opened. The leads were disconnected. The battery was removed. A paramedian incision was then made in the mid back over the existing scar. The anchors were released, and the leads were removed. The T12-L1 epidural space was entered as a loss of resistance was felt with an air filled syringe. Both leads were replaced and repositioned. One 8 contact compact spinal cord stimulator lead was advanced until the top of the lead was top of the T7 vertebral level. The lead was left of the midline. A second 8 contact compact spinal cord stimulator lead was positioned and advanced until the top of the lead was 1 contact into the T6-T7 vertebral level. This lead was midline.  Lateral and AP fluoroscopy views were obtained and confirmed appropriate posterior epidural placement. The anchors were placed and tightened around the leads using Abbott s anchoring system and then to the fascia using 2.0 silk sutures. The skin was anesthetized with Lidocaine 1% with epinephrine, and a superficial gluteal pocket was developed on the left side. A subcutaneous tunnel was made, and the cables were pulled through to the gluteal pocket. The cable ends were connected to the IPG, and all connections were secured to torque. An impedance check was performed, and all impedances were within normal limits. The wounds were then irrigated profusely with saline solution. The IPG and excess cable were then placed into the gluteal pocket with the excess cable coiled behind the IPG. All three incisions were closed in layers using absorbable sutures. Steri-strips were used as well as sutures. Final X-ray revealed no significant changes in lead positions.   The patient was then brought back to the recovery area where they were monitored by a registered nurse. Physiological parameters were observed utilizing pulse oximetry and blood in recovery. The patient tolerated the procedure well and no immediate complications were  encountered. When the patient's vital signs and level of consciousness returned to pre-sedation levels, they were determined to be ready for discharge. Post-procedure instructions were given to the patient/family and they verbalized understanding. The patient was then discharged in stable condition.     An antibiotic was prescribed as a prophylaxis and the patient will take this for the next 10 days.    Impression: Technically successful Abbott Spinal Cord Stimulator Lead Revision and Generator Pocket Revision.  Long-term results are pending.   Note--attempt was made initially with straight needles at T12-L1 but the leads were difficult to advance and ended up in an anterior position; after entering from the left and right, and several attempts at getting posterior placement, attempt was made at T11-12 with similar results. T12-L1 was reattempted with coude needles and the leads were able to be positioned properly.       Zo Stanton MD   April 15, 2021

## 2021-10-03 ENCOUNTER — HEALTH MAINTENANCE LETTER (OUTPATIENT)
Age: 46
End: 2021-10-03

## 2022-03-20 ENCOUNTER — HEALTH MAINTENANCE LETTER (OUTPATIENT)
Age: 47
End: 2022-03-20

## 2022-09-10 ENCOUNTER — HEALTH MAINTENANCE LETTER (OUTPATIENT)
Age: 47
End: 2022-09-10

## 2023-04-30 ENCOUNTER — HEALTH MAINTENANCE LETTER (OUTPATIENT)
Age: 48
End: 2023-04-30

## 2023-06-22 NOTE — DISCHARGE INSTRUCTIONS
Same Day Surgery Discharge Instructions for  Sedation and General Anesthesia       It's not unusual to feel dizzy, light-headed or faint for up to 24 hours after surgery or while taking pain medication.  If you have these symptoms: sit for a few minutes before standing and have someone assist you when you get up to walk or use the bathroom.      You should rest and relax for the next 24 hours. We recommend you make arrangements to have an adult stay with you for at least 24 hours after your discharge.  Avoid hazardous and strenuous activity.      DO NOT DRIVE any vehicle or operate mechanical equipment for 24 hours following the end of your surgery.  Even though you may feel normal, your reactions may be affected by the medication you have received.      Do not drink alcoholic beverages for 24 hours following surgery.       Slowly progress to your regular diet as you feel able. It's not unusual to feel nauseated and/or vomit after receiving anesthesia.  If you develop these symptoms, drink clear liquids (apple juice, ginger ale, broth, 7-up, etc. ) until you feel better.  If your nausea and vomiting persists for 24 hours, please notify your surgeon.        All narcotic pain medications, along with inactivity and anesthesia, can cause constipation. Drinking plenty of liquids and increasing fiber intake will help.      For any questions of a medical nature, call your surgeon.      Do not make important decisions for 24 hours.      If you had general anesthesia, you may have a sore throat for a couple of days related to the breathing tube used during surgery.  You may use Cepacol lozenges to help with this discomfort.  If it worsens or if you develop a fever, contact your surgeon.       If you feel your pain is not well managed with the pain medications prescribed by your surgeon, please contact your surgeon's office to let them know so they can address your concerns.       Spinal Cord Stimulator Trial Discharge  Instructions    After your trial procedure:      Resume light activity as tolerated    Restrict bending, lifting and twisting to reduce the chance of your leads moving.      Resume your regular pain medication regimen    Begin taking your oral antibiotic the day after your trial procedure.  It is important you finish the entire course of medication regimen    No driving with the stimulator on (Nevro brand--exempt)    A small amount of blood visible in the bandage is aruna;, if any leaks out of the bandage contact [facility].    Site Care:      DO NOT remove any bandages from the trial.  A RN or your provider will assess your dressing at your mid-trial appointment and will remove the dressing at your lead pull appointment.     NO showering, baths, pools or whirlpools during the trial    Bandage needs to stay dry and intact.    After the bandage is removed, check your insertion sites daily.  Keep the area clean and dry.    Call [facility] during business hours or the on-call provider if you develop any of the following:      Signs of infection such as: fevers, chills, increased pain, drainage (as mentioned above), redness and swelling from your incision sites.    Headaches persisting for more than 48 hours.      Unusual changes in or stopping of sensation in your legs or back.      Uncontrolled pain.    Signs or symptoms of a deep vein thrombosis (DVT): swelling in one or both legs, pain or tenderness in one or both legs, warm skin on your leg, and/or red or discolored skin on your leg.    [facility]: [phone]     After hours provider: [phone]    Emergency Situations go to the Emergency Department or Call 911:      Sudden sever back pain.    Sudden onset of leg weakness and spasms.    Loss of bladder control and/or bowel function.    Signs or symptoms of a pulmonary embolism(PE): sudden coughing--which may bring up blood sharp chest pain, rapid breathing or shortness of  breath, and/or severe lightheadedness.         on the discharge service for the patient. I have reviewed and made amendments to the documentation where necessary.

## 2025-07-24 ENCOUNTER — TRANSCRIBE ORDERS (OUTPATIENT)
Dept: OTHER | Age: 50
End: 2025-07-24

## 2025-07-24 ENCOUNTER — MEDICAL CORRESPONDENCE (OUTPATIENT)
Dept: HEALTH INFORMATION MANAGEMENT | Facility: CLINIC | Age: 50
End: 2025-07-24
Payer: COMMERCIAL

## 2025-07-24 DIAGNOSIS — H49.40 CPEO (CHRONIC PROGRESSIVE EXTERNAL OPHTHALMOPLEGIA) WITH MYOPATHY: Primary | ICD-10-CM

## 2025-07-24 DIAGNOSIS — G72.9 CPEO (CHRONIC PROGRESSIVE EXTERNAL OPHTHALMOPLEGIA) WITH MYOPATHY: Primary | ICD-10-CM

## (undated) DEVICE — DRSG KERLIX FLUFFS X5

## (undated) DEVICE — LINEN TOWEL PACK X5 5464

## (undated) DEVICE — SYR EAR BULB 3OZ 0035830

## (undated) DEVICE — SYR 07ML EPIDURAL LOSS OF RESISTANCE PULSATOR 4905

## (undated) DEVICE — SU SILK 2-0 SH CR 8X18" C012D

## (undated) DEVICE — DRSG GAUZE 4X4" 3033

## (undated) DEVICE — SOL WATER IRRIG 1000ML BOTTLE 2F7114

## (undated) DEVICE — SOL ADH LIQUID BENZOIN SWAB 0.6ML C1544

## (undated) DEVICE — DRAPE COVER C-ARM SEAMLESS SNAP-KAP 03-KP26 LATEX FREE

## (undated) DEVICE — SOL NACL 0.9% IRRIG 1000ML BOTTLE 2F7124

## (undated) DEVICE — SU VICRYL 4-0 PS-2 18" UND J496H

## (undated) DEVICE — EPIDURAL NEEDLE

## (undated) DEVICE — DRSG TELFA ISLAND 4X8" 7541

## (undated) DEVICE — ADH LIQUID MASTISOL TOPICAL VIAL 2-3ML 0523-48

## (undated) DEVICE — POSITIONER PT PRONESAFE HEAD REST W/DERMAPROX INSERT 40599

## (undated) DEVICE — Device

## (undated) DEVICE — PACK UNIVERSAL SPLIT 29131

## (undated) DEVICE — GLOVE PROTEXIS W/NEU-THERA 6.5  2D73TE65

## (undated) DEVICE — GLOVE PROTEXIS BLUE W/NEU-THERA 6.0  2D73EB60

## (undated) DEVICE — PACK MINOR SBA15MIFSE

## (undated) DEVICE — GLOVE PROTEXIS POWDER FREE 7.0 ORTHOPEDIC 2D73ET70

## (undated) DEVICE — GLOVE PROTEXIS MICRO 6.5  2D73PM65

## (undated) DEVICE — DRSG STERI STRIP 1/2X4" R1547

## (undated) DEVICE — DRAPE STERI TOWEL LG 1010

## (undated) DEVICE — DRAPE SHEET REV FOLD 3/4 9349

## (undated) DEVICE — SPONGE RAY-TEC 4X8" 7318

## (undated) DEVICE — PREP DURAPREP 26ML APL 8630

## (undated) DEVICE — SU VICRYL 0 CT-2 CR 8X18" J727D

## (undated) DEVICE — GLOVE PROTEXIS MICRO 6.0  2D73PM60

## (undated) DEVICE — DECANTER VIAL 2006S

## (undated) DEVICE — DRAPE IOBAN INCISE 23X17" 6650EZ

## (undated) DEVICE — DRSG TEGADERM 6X8" 1628

## (undated) DEVICE — SU MONOCRYL 4-0 PS-2 18" UND Y496G

## (undated) DEVICE — NDL 19GA 1.5"

## (undated) DEVICE — DRSG TELFA ISLAND 4X10"

## (undated) DEVICE — DRSG TEGADERM 4X4 3/4" 1626

## (undated) RX ORDER — LIDOCAINE HYDROCHLORIDE 10 MG/ML
INJECTION, SOLUTION EPIDURAL; INFILTRATION; INTRACAUDAL; PERINEURAL
Status: DISPENSED
Start: 2019-10-25

## (undated) RX ORDER — HYDROMORPHONE HYDROCHLORIDE 1 MG/ML
INJECTION, SOLUTION INTRAMUSCULAR; INTRAVENOUS; SUBCUTANEOUS
Status: DISPENSED
Start: 2021-04-15

## (undated) RX ORDER — ONDANSETRON 2 MG/ML
INJECTION INTRAMUSCULAR; INTRAVENOUS
Status: DISPENSED
Start: 2021-04-15

## (undated) RX ORDER — PROPOFOL 10 MG/ML
INJECTION, EMULSION INTRAVENOUS
Status: DISPENSED
Start: 2021-04-15

## (undated) RX ORDER — CLINDAMYCIN PHOSPHATE 900 MG/50ML
INJECTION, SOLUTION INTRAVENOUS
Status: DISPENSED
Start: 2020-01-02

## (undated) RX ORDER — FENTANYL CITRATE 50 UG/ML
INJECTION, SOLUTION INTRAMUSCULAR; INTRAVENOUS
Status: DISPENSED
Start: 2020-01-02

## (undated) RX ORDER — DEXAMETHASONE SODIUM PHOSPHATE 4 MG/ML
INJECTION, SOLUTION INTRA-ARTICULAR; INTRALESIONAL; INTRAMUSCULAR; INTRAVENOUS; SOFT TISSUE
Status: DISPENSED
Start: 2020-01-02

## (undated) RX ORDER — FENTANYL CITRATE 0.05 MG/ML
INJECTION, SOLUTION INTRAMUSCULAR; INTRAVENOUS
Status: DISPENSED
Start: 2019-10-25

## (undated) RX ORDER — FENTANYL CITRATE 0.05 MG/ML
INJECTION, SOLUTION INTRAMUSCULAR; INTRAVENOUS
Status: DISPENSED
Start: 2021-04-15

## (undated) RX ORDER — LIDOCAINE HYDROCHLORIDE 10 MG/ML
INJECTION, SOLUTION EPIDURAL; INFILTRATION; INTRACAUDAL; PERINEURAL
Status: DISPENSED
Start: 2021-04-15

## (undated) RX ORDER — ONDANSETRON 2 MG/ML
INJECTION INTRAMUSCULAR; INTRAVENOUS
Status: DISPENSED
Start: 2019-10-25

## (undated) RX ORDER — HYDROCODONE BITARTRATE AND ACETAMINOPHEN 5; 325 MG/1; MG/1
TABLET ORAL
Status: DISPENSED
Start: 2020-01-02

## (undated) RX ORDER — LIDOCAINE HYDROCHLORIDE 20 MG/ML
INJECTION, SOLUTION EPIDURAL; INFILTRATION; INTRACAUDAL; PERINEURAL
Status: DISPENSED
Start: 2021-04-15

## (undated) RX ORDER — CLINDAMYCIN PHOSPHATE 900 MG/50ML
INJECTION, SOLUTION INTRAVENOUS
Status: DISPENSED
Start: 2021-04-15

## (undated) RX ORDER — PROPOFOL 10 MG/ML
INJECTION, EMULSION INTRAVENOUS
Status: DISPENSED
Start: 2019-10-25

## (undated) RX ORDER — ONDANSETRON 2 MG/ML
INJECTION INTRAMUSCULAR; INTRAVENOUS
Status: DISPENSED
Start: 2020-01-02

## (undated) RX ORDER — FENTANYL CITRATE 50 UG/ML
INJECTION, SOLUTION INTRAMUSCULAR; INTRAVENOUS
Status: DISPENSED
Start: 2019-10-25

## (undated) RX ORDER — CLINDAMYCIN PHOSPHATE 900 MG/50ML
INJECTION, SOLUTION INTRAVENOUS
Status: DISPENSED
Start: 2019-10-25

## (undated) RX ORDER — FENTANYL CITRATE 50 UG/ML
INJECTION, SOLUTION INTRAMUSCULAR; INTRAVENOUS
Status: DISPENSED
Start: 2021-04-15

## (undated) RX ORDER — PROPOFOL 10 MG/ML
INJECTION, EMULSION INTRAVENOUS
Status: DISPENSED
Start: 2019-10-23

## (undated) RX ORDER — OXYCODONE HYDROCHLORIDE 5 MG/1
TABLET ORAL
Status: DISPENSED
Start: 2021-04-15